# Patient Record
Sex: FEMALE | Race: WHITE | Employment: OTHER | URBAN - METROPOLITAN AREA
[De-identification: names, ages, dates, MRNs, and addresses within clinical notes are randomized per-mention and may not be internally consistent; named-entity substitution may affect disease eponyms.]

---

## 2017-02-07 ENCOUNTER — OFFICE VISIT (OUTPATIENT)
Dept: OBGYN CLINIC | Facility: CLINIC | Age: 27
End: 2017-02-07

## 2017-02-07 VITALS
SYSTOLIC BLOOD PRESSURE: 120 MMHG | HEIGHT: 67.75 IN | DIASTOLIC BLOOD PRESSURE: 84 MMHG | HEART RATE: 74 BPM | WEIGHT: 148 LBS | BODY MASS INDEX: 22.69 KG/M2

## 2017-02-07 DIAGNOSIS — Z11.3 SCREEN FOR STD (SEXUALLY TRANSMITTED DISEASE): ICD-10-CM

## 2017-02-07 DIAGNOSIS — R87.620 ASCUS WITH POSITIVE HIGH RISK HUMAN PAPILLOMAVIRUS OF VAGINA: ICD-10-CM

## 2017-02-07 DIAGNOSIS — Z01.419 WELL WOMAN EXAM WITH ROUTINE GYNECOLOGICAL EXAM: Primary | ICD-10-CM

## 2017-02-07 DIAGNOSIS — R87.811 ASCUS WITH POSITIVE HIGH RISK HUMAN PAPILLOMAVIRUS OF VAGINA: ICD-10-CM

## 2017-02-07 PROCEDURE — 99395 PREV VISIT EST AGE 18-39: CPT | Performed by: OBSTETRICS & GYNECOLOGY

## 2017-02-07 PROCEDURE — 88175 CYTOPATH C/V AUTO FLUID REDO: CPT | Performed by: OBSTETRICS & GYNECOLOGY

## 2017-02-07 PROCEDURE — 87591 N.GONORRHOEAE DNA AMP PROB: CPT | Performed by: OBSTETRICS & GYNECOLOGY

## 2017-02-07 PROCEDURE — 87624 HPV HI-RISK TYP POOLED RSLT: CPT | Performed by: OBSTETRICS & GYNECOLOGY

## 2017-02-07 PROCEDURE — 87491 CHLMYD TRACH DNA AMP PROBE: CPT | Performed by: OBSTETRICS & GYNECOLOGY

## 2017-02-07 NOTE — PROGRESS NOTES
Here for new gynecology visit. 27year old G 0 P 0. Patient's last menstrual period was 01/31/2017. Migel Vila Here for general gyn exam and follow up abnormal pap smear. Had initial pap smear one year ago and ASCUS with HR HPV. Never got Gardasil series.  No p with mild symmetrical eversion. Pap smear was done with HPV. Accepts STD screening. UTERUS:  Anteflexed and normal size. ADNEXA:  No mass or tenderness. RECTOVAGINAL:  Not indicated. EXTREMITIES:  Unremarkable.       IMP:  Normal gyn exam. Hx of ASCUS

## 2017-02-08 LAB
C TRACH DNA SPEC QL NAA+PROBE: NEGATIVE
N GONORRHOEA DNA SPEC QL NAA+PROBE: NEGATIVE

## 2017-02-09 ENCOUNTER — TELEPHONE (OUTPATIENT)
Dept: OBGYN CLINIC | Facility: CLINIC | Age: 27
End: 2017-02-09

## 2017-02-09 LAB — LAST PAP RESULT: NORMAL

## 2017-02-09 NOTE — TELEPHONE ENCOUNTER
Advised pt GC/Chlamydia negative; pap normal; HPV pending. Advised pt will call when results available. Patient verbalized understanding.

## 2017-02-09 NOTE — PROGRESS NOTES
Quick Note:    Left detailed message on cell voicemail. Pt has FYI. STI testing negative. Call if any questions or concerns.   ______

## 2017-02-10 LAB — HPV I/H RISK 1 DNA SPEC QL NAA+PROBE: POSITIVE

## 2017-02-10 NOTE — PROGRESS NOTES
Quick Note:    Repeat pap smear same - ASCUS with HPV. Based on age, low risk for significant cervical problem. Needs repeat pap smear in one year.  Gardasil series still option for pt.  ______

## 2017-02-14 NOTE — PROGRESS NOTES
Quick Note:    Patient notified of pap results. Advised patient we will repeat the pap in 1 year. Discussed Guardasil series, recommendations. Patient would like to do some research but will call if desires.   ______

## 2017-04-29 ENCOUNTER — HOSPITAL ENCOUNTER (OUTPATIENT)
Age: 27
Discharge: HOME | End: 2017-04-29
Attending: EMERGENCY MEDICINE
Payer: COMMERCIAL

## 2017-04-29 VITALS
WEIGHT: 150 LBS | OXYGEN SATURATION: 100 % | HEART RATE: 97 BPM | TEMPERATURE: 98 F | SYSTOLIC BLOOD PRESSURE: 134 MMHG | DIASTOLIC BLOOD PRESSURE: 75 MMHG | BODY MASS INDEX: 22.73 KG/M2 | RESPIRATION RATE: 18 BRPM | HEIGHT: 68 IN

## 2017-04-29 DIAGNOSIS — S39.012A LUMBAR STRAIN, INITIAL ENCOUNTER: Primary | ICD-10-CM

## 2017-04-29 PROCEDURE — 81002 URINALYSIS NONAUTO W/O SCOPE: CPT

## 2017-04-29 PROCEDURE — 99204 OFFICE O/P NEW MOD 45 MIN: CPT

## 2017-04-29 PROCEDURE — 99213 OFFICE O/P EST LOW 20 MIN: CPT

## 2017-04-29 RX ORDER — CYCLOBENZAPRINE HCL 10 MG
5 TABLET ORAL 3 TIMES DAILY PRN
Qty: 15 TABLET | Refills: 0 | Status: SHIPPED | OUTPATIENT
Start: 2017-04-29 | End: 2017-05-04

## 2017-04-29 RX ORDER — IBUPROFEN 600 MG/1
600 TABLET ORAL EVERY 8 HOURS PRN
Qty: 30 TABLET | Refills: 0 | Status: SHIPPED | OUTPATIENT
Start: 2017-04-29 | End: 2017-05-06

## 2017-04-29 RX ORDER — HYDROCODONE BITARTRATE AND ACETAMINOPHEN 5; 325 MG/1; MG/1
1 TABLET ORAL EVERY 8 HOURS PRN
Qty: 15 TABLET | Refills: 0 | Status: SHIPPED | OUTPATIENT
Start: 2017-04-29 | End: 2017-05-06

## 2017-04-29 NOTE — ED INITIAL ASSESSMENT (HPI)
Patient presents with back pain which started on Thursday, following working out at the gym. She has attempted working through the pain, but it has progressed to the point that she cannot walk,  sit or sleep without discomfort.

## 2017-04-29 NOTE — ED PROVIDER NOTES
Patient Seen in: [address]    History   Patient presents with:  Back Pain (musculoskeletal)    Stated Complaint: lower back pain    HPI    The patient is a 81-year-old female with no significant past medical history pre except as otherwise stated in HPI.     Physical Exam       ED Triage Vitals   BP 04/29/17 1001 134/75 mmHg   Pulse 04/29/17 1001 97   Resp 04/29/17 1001 18   Temp 04/29/17 1001 98.2 °F (36.8 °C)   Temp src 04/29/17 1001 Oral   SpO2 04/29/17 1001 100 %   O2 by mouth every 8 (eight) hours as needed for Pain or Fever. Qty: 30 tablet Refills: 0    HYDROcodone-acetaminophen 5-325 MG Oral Tab  Take 1 tablet by mouth every 8 (eight) hours as needed for Pain.   Qty: 15 tablet Refills: 0    Cyclobenzaprine HCl 10 MG

## 2018-01-31 ENCOUNTER — OFFICE VISIT (OUTPATIENT)
Dept: FAMILY MEDICINE CLINIC | Facility: CLINIC | Age: 28
End: 2018-01-31

## 2018-01-31 ENCOUNTER — LAB ENCOUNTER (OUTPATIENT)
Dept: LAB | Age: 28
End: 2018-01-31
Attending: FAMILY MEDICINE
Payer: COMMERCIAL

## 2018-01-31 VITALS
DIASTOLIC BLOOD PRESSURE: 80 MMHG | WEIGHT: 157 LBS | SYSTOLIC BLOOD PRESSURE: 120 MMHG | HEIGHT: 67.75 IN | RESPIRATION RATE: 18 BRPM | BODY MASS INDEX: 24.07 KG/M2 | HEART RATE: 70 BPM | TEMPERATURE: 98 F

## 2018-01-31 DIAGNOSIS — Z00.00 ROUTINE GENERAL MEDICAL EXAMINATION AT A HEALTH CARE FACILITY: ICD-10-CM

## 2018-01-31 DIAGNOSIS — Z00.00 ROUTINE GENERAL MEDICAL EXAMINATION AT A HEALTH CARE FACILITY: Primary | ICD-10-CM

## 2018-01-31 LAB
25-HYDROXYVITAMIN D (TOTAL): 30.1 NG/ML (ref 30–100)
ALBUMIN SERPL-MCNC: 4.7 G/DL (ref 3.5–4.8)
ALP LIVER SERPL-CCNC: 58 U/L (ref 37–98)
ALT SERPL-CCNC: 20 U/L (ref 14–54)
AST SERPL-CCNC: 17 U/L (ref 15–41)
BASOPHILS # BLD AUTO: 0.08 X10(3) UL (ref 0–0.1)
BASOPHILS NFR BLD AUTO: 1.3 %
BILIRUB SERPL-MCNC: 0.5 MG/DL (ref 0.1–2)
BILIRUB UR QL STRIP.AUTO: NEGATIVE
BUN BLD-MCNC: 6 MG/DL (ref 8–20)
CALCIUM BLD-MCNC: 8.8 MG/DL (ref 8.3–10.3)
CHLORIDE: 104 MMOL/L (ref 101–111)
CHOLEST SMN-MCNC: 158 MG/DL (ref ?–200)
CLARITY UR REFRACT.AUTO: CLEAR
CO2: 25 MMOL/L (ref 22–32)
COLOR UR AUTO: YELLOW
CREAT BLD-MCNC: 0.56 MG/DL (ref 0.55–1.02)
EOSINOPHIL # BLD AUTO: 0.09 X10(3) UL (ref 0–0.3)
EOSINOPHIL NFR BLD AUTO: 1.5 %
ERYTHROCYTE [DISTWIDTH] IN BLOOD BY AUTOMATED COUNT: 11.9 % (ref 11.5–16)
GLUCOSE BLD-MCNC: 79 MG/DL (ref 70–99)
GLUCOSE UR STRIP.AUTO-MCNC: NEGATIVE MG/DL
HCT VFR BLD AUTO: 41.5 % (ref 34–50)
HDLC SERPL-MCNC: 91 MG/DL (ref 45–?)
HDLC SERPL: 1.74 {RATIO} (ref ?–4.44)
HGB BLD-MCNC: 13.8 G/DL (ref 12–16)
IMMATURE GRANULOCYTE COUNT: 0.01 X10(3) UL (ref 0–1)
IMMATURE GRANULOCYTE RATIO %: 0.2 %
KETONES UR STRIP.AUTO-MCNC: NEGATIVE MG/DL
LDLC SERPL CALC-MCNC: 58 MG/DL (ref ?–130)
LEUKOCYTE ESTERASE UR QL STRIP.AUTO: NEGATIVE
LYMPHOCYTES # BLD AUTO: 2.16 X10(3) UL (ref 0.9–4)
LYMPHOCYTES NFR BLD AUTO: 36.4 %
M PROTEIN MFR SERPL ELPH: 8.4 G/DL (ref 6.1–8.3)
MCH RBC QN AUTO: 32.3 PG (ref 27–33.2)
MCHC RBC AUTO-ENTMCNC: 33.3 G/DL (ref 31–37)
MCV RBC AUTO: 97.2 FL (ref 81–100)
MONOCYTES # BLD AUTO: 0.56 X10(3) UL (ref 0.1–0.6)
MONOCYTES NFR BLD AUTO: 9.4 %
NEUTROPHIL ABS PRELIM: 3.04 X10 (3) UL (ref 1.3–6.7)
NEUTROPHILS # BLD AUTO: 3.04 X10(3) UL (ref 1.3–6.7)
NEUTROPHILS NFR BLD AUTO: 51.2 %
NITRITE UR QL STRIP.AUTO: NEGATIVE
NONHDLC SERPL-MCNC: 67 MG/DL (ref ?–130)
PH UR STRIP.AUTO: 8 [PH] (ref 4.5–8)
PLATELET # BLD AUTO: 212 10(3)UL (ref 150–450)
POTASSIUM SERPL-SCNC: 4.1 MMOL/L (ref 3.6–5.1)
PROT UR STRIP.AUTO-MCNC: NEGATIVE MG/DL
RBC # BLD AUTO: 4.27 X10(6)UL (ref 3.8–5.1)
RBC UR QL AUTO: NEGATIVE
RED CELL DISTRIBUTION WIDTH-SD: 42.5 FL (ref 35.1–46.3)
SODIUM SERPL-SCNC: 141 MMOL/L (ref 136–144)
SP GR UR STRIP.AUTO: 1.02 (ref 1–1.03)
TRIGL SERPL-MCNC: 46 MG/DL (ref ?–150)
TSI SER-ACNC: 3.3 MIU/ML (ref 0.35–5.5)
UROBILINOGEN UR STRIP.AUTO-MCNC: <2 MG/DL
VLDLC SERPL CALC-MCNC: 9 MG/DL (ref 5–40)
WBC # BLD AUTO: 5.9 X10(3) UL (ref 4–13)

## 2018-01-31 PROCEDURE — 85025 COMPLETE CBC W/AUTO DIFF WBC: CPT

## 2018-01-31 PROCEDURE — 81003 URINALYSIS AUTO W/O SCOPE: CPT

## 2018-01-31 PROCEDURE — 80061 LIPID PANEL: CPT

## 2018-01-31 PROCEDURE — 36415 COLL VENOUS BLD VENIPUNCTURE: CPT

## 2018-01-31 PROCEDURE — 80053 COMPREHEN METABOLIC PANEL: CPT

## 2018-01-31 PROCEDURE — 82306 VITAMIN D 25 HYDROXY: CPT

## 2018-01-31 PROCEDURE — 99395 PREV VISIT EST AGE 18-39: CPT | Performed by: FAMILY MEDICINE

## 2018-01-31 PROCEDURE — 84443 ASSAY THYROID STIM HORMONE: CPT

## 2018-01-31 NOTE — PATIENT INSTRUCTIONS
Prevention Guidelines, Women Ages 25 to 44  Screening tests and vaccines are an important part of managing your health. Health counseling is essential, too. Below are guidelines for these, for women ages 25 to 44.  Talk with your healthcare provider to ma Tuberculosis Women at increased risk for infection should talk with their healthcare provider Ask your healthcare provider   Vision All women in this age group At least 1 complete exam in your 25s, and 2 in your 35s   Vaccine2 Who needs it How often   Chic Tetanus/diphtheria/pertussis (Td/Tdap) booster All women in this age group Td every 10 years, or a one-time dose of Tdap instead of a Td booster after age 25, then Td every 10 years   Counseling Who needs it How often   BRCA gene mutation testing for breas

## 2018-01-31 NOTE — PROGRESS NOTES
HPI:   Evangelina Hanson is a 24year old female who presents for a complete physical exam. Symptoms: denies discharge, itching, burning or dysuria, periods are regular. Patient complains of nothing today.   Denies any recent illnesses or hospitalizations blurred vision or double vision  HEENT: denies nasal congestion, sinus pain or ST  LUNGS: denies shortness of breath with exertion, denies cough  CARDIOVASCULAR: denies chest pain on exertion or at rest, denies palpitations  GI: denies abdominal pain,denie PANEL    Discussed diet and exercise. Pt' s weight is Body mass index is 24.05 kg/m². , recommended low fat diet and aerobic exercise 30 minutes three times weekly. The patient indicates understanding of these issues and agrees to the plan.     Routine g

## 2018-02-22 ENCOUNTER — OFFICE VISIT (OUTPATIENT)
Dept: OBGYN CLINIC | Facility: CLINIC | Age: 28
End: 2018-02-22

## 2018-02-22 VITALS
DIASTOLIC BLOOD PRESSURE: 66 MMHG | BODY MASS INDEX: 23.49 KG/M2 | HEIGHT: 68 IN | WEIGHT: 155 LBS | SYSTOLIC BLOOD PRESSURE: 112 MMHG

## 2018-02-22 DIAGNOSIS — Z87.42 HX OF ABNORMAL CERVICAL PAP SMEAR: Primary | ICD-10-CM

## 2018-02-22 DIAGNOSIS — Z11.3 SCREENING EXAMINATION FOR STD (SEXUALLY TRANSMITTED DISEASE): ICD-10-CM

## 2018-02-22 DIAGNOSIS — Z01.419 WELL WOMAN EXAM WITH ROUTINE GYNECOLOGICAL EXAM: ICD-10-CM

## 2018-02-22 PROCEDURE — 88175 CYTOPATH C/V AUTO FLUID REDO: CPT | Performed by: OBSTETRICS & GYNECOLOGY

## 2018-02-22 PROCEDURE — 87491 CHLMYD TRACH DNA AMP PROBE: CPT | Performed by: OBSTETRICS & GYNECOLOGY

## 2018-02-22 PROCEDURE — 87624 HPV HI-RISK TYP POOLED RSLT: CPT | Performed by: OBSTETRICS & GYNECOLOGY

## 2018-02-22 PROCEDURE — 87591 N.GONORRHOEAE DNA AMP PROB: CPT | Performed by: OBSTETRICS & GYNECOLOGY

## 2018-02-22 PROCEDURE — 99395 PREV VISIT EST AGE 18-39: CPT | Performed by: OBSTETRICS & GYNECOLOGY

## 2018-02-22 NOTE — PROGRESS NOTES
[address] [facility]  Obstetrics and Gynecology  History & Physical    CC: Patient is here for annual well woman exam     Subjective:     HPI: Carlitos Mae is a 24year old [place] female here for annual well women exam.  Patient reports no compaint maintenance:  Mammogram (age 36 and q1-2yr): n/a  Colonoscopy (age 48 and q7yr): n/a    Review of Systems:  General:  denies fevers, chills, fatigue and malaise  Breast:  denies rashes, skin changes, pain, lumps or discharge   Respiratory:  denies SOB, dy understanding and agrees with the plan of care. All questions were answered to the best of my ability at this time.       RTC in 1 year for a well woman exam or sooner if needed     Chelita Hirsch MD   [facility] - OBGYN

## 2018-02-23 PROBLEM — Z01.419 WELL WOMAN EXAM WITH ROUTINE GYNECOLOGICAL EXAM: Status: ACTIVE | Noted: 2018-02-23

## 2018-02-23 PROBLEM — Z11.3 SCREENING EXAMINATION FOR STD (SEXUALLY TRANSMITTED DISEASE): Status: ACTIVE | Noted: 2018-02-23

## 2018-02-23 PROBLEM — Z87.42 HX OF ABNORMAL CERVICAL PAP SMEAR: Status: ACTIVE | Noted: 2018-02-23

## 2018-02-23 LAB
C TRACH DNA SPEC QL NAA+PROBE: NEGATIVE
N GONORRHOEA DNA SPEC QL NAA+PROBE: NEGATIVE

## 2018-02-27 LAB — HPV I/H RISK 1 DNA SPEC QL NAA+PROBE: POSITIVE

## 2018-02-28 NOTE — PROGRESS NOTES
Patient informed of results. Verbalized understanding. No further questions or concerns. Brochure sent to patient.

## 2018-02-28 NOTE — PROGRESS NOTES
Please inform the patient of abnormal pap smear findings including ASCUS with HPV positive. Recommend colposcopy. Please provide information and schedule for procedure.

## 2018-04-24 ENCOUNTER — OFFICE VISIT (OUTPATIENT)
Dept: OBGYN CLINIC | Facility: CLINIC | Age: 28
End: 2018-04-24

## 2018-04-24 VITALS
SYSTOLIC BLOOD PRESSURE: 112 MMHG | HEIGHT: 68 IN | DIASTOLIC BLOOD PRESSURE: 72 MMHG | WEIGHT: 157 LBS | BODY MASS INDEX: 23.79 KG/M2

## 2018-04-24 DIAGNOSIS — Z23 NEED FOR VACCINATION: ICD-10-CM

## 2018-04-24 DIAGNOSIS — R87.610 ASCUS WITH POSITIVE HIGH RISK HPV CERVICAL: ICD-10-CM

## 2018-04-24 DIAGNOSIS — Z23 NEED FOR HPV VACCINE: ICD-10-CM

## 2018-04-24 DIAGNOSIS — Z32.02 PREGNANCY EXAMINATION OR TEST, NEGATIVE RESULT: Primary | ICD-10-CM

## 2018-04-24 DIAGNOSIS — R87.810 ASCUS WITH POSITIVE HIGH RISK HPV CERVICAL: ICD-10-CM

## 2018-04-24 PROCEDURE — 57454 BX/CURETT OF CERVIX W/SCOPE: CPT | Performed by: OBSTETRICS & GYNECOLOGY

## 2018-04-24 PROCEDURE — 90471 IMMUNIZATION ADMIN: CPT | Performed by: OBSTETRICS & GYNECOLOGY

## 2018-04-24 PROCEDURE — 81025 URINE PREGNANCY TEST: CPT | Performed by: OBSTETRICS & GYNECOLOGY

## 2018-04-24 PROCEDURE — 88342 IMHCHEM/IMCYTCHM 1ST ANTB: CPT | Performed by: OBSTETRICS & GYNECOLOGY

## 2018-04-24 PROCEDURE — 90651 9VHPV VACCINE 2/3 DOSE IM: CPT | Performed by: OBSTETRICS & GYNECOLOGY

## 2018-04-24 PROCEDURE — 88305 TISSUE EXAM BY PATHOLOGIST: CPT | Performed by: OBSTETRICS & GYNECOLOGY

## 2018-04-24 NOTE — PROCEDURES
Colposcopy Procedure Note    Date of Procedure: 04/24/18    Indications: 24year old y/o female with ASCUS with HPV positive     Pre-procedure diagnosis:   ASCUS with HPV positive     Post-procedure diagnosis:  ASCUS with HPV positive     Procedure:  Colpo

## 2018-04-24 NOTE — PATIENT INSTRUCTIONS
[facility] Department of OB/GYN  After Care Instructions for Colposcopy/Biopsy      Biopsy Results   You will receive a phone call with your biopsy results in 7 business days. If you have not received your biopsy results in 7 days, please contact our office.   Tai Espinoza

## 2018-04-30 PROBLEM — Z11.3 SCREENING EXAMINATION FOR STD (SEXUALLY TRANSMITTED DISEASE): Status: RESOLVED | Noted: 2018-02-23 | Resolved: 2018-04-30

## 2018-04-30 PROBLEM — Z01.419 WELL WOMAN EXAM WITH ROUTINE GYNECOLOGICAL EXAM: Status: RESOLVED | Noted: 2018-02-23 | Resolved: 2018-04-30

## 2018-04-30 NOTE — PROGRESS NOTES
[facility]  Obstetrics and Gynecology  Follow Up Progress Note    Subjective:     Emi Thompson is a 24year old [place] female who is here for CM II follow up and discussion.  She is s/p colposcopy with ECC and cervical biopsy 2/2 ASCUS wi understanding and agrees with the plan of care. All questions were answered to the best of my ability at this time.     RTC in 6 months for colposcopy or sooner if needed     Will Morrow MD   [facility] - OBGYN

## 2018-05-01 ENCOUNTER — OFFICE VISIT (OUTPATIENT)
Dept: OBGYN CLINIC | Facility: CLINIC | Age: 28
End: 2018-05-01

## 2018-05-01 VITALS
DIASTOLIC BLOOD PRESSURE: 80 MMHG | HEIGHT: 68 IN | BODY MASS INDEX: 24.1 KG/M2 | SYSTOLIC BLOOD PRESSURE: 116 MMHG | WEIGHT: 159 LBS

## 2018-05-01 DIAGNOSIS — N87.1 CIN II (CERVICAL INTRAEPITHELIAL NEOPLASIA II): ICD-10-CM

## 2018-05-01 DIAGNOSIS — Z87.42 HX OF ABNORMAL CERVICAL PAP SMEAR: Primary | ICD-10-CM

## 2018-05-01 PROCEDURE — 99212 OFFICE O/P EST SF 10 MIN: CPT | Performed by: OBSTETRICS & GYNECOLOGY

## 2018-05-01 NOTE — PATIENT INSTRUCTIONS
Please return in 6 months for repeat colposcopy or sooner if having abnormal vaginal bleeding or severe pelvic pain

## 2018-07-02 ENCOUNTER — OFFICE VISIT (OUTPATIENT)
Dept: OBGYN CLINIC | Facility: CLINIC | Age: 28
End: 2018-07-02

## 2018-07-02 DIAGNOSIS — Z23 NEED FOR VACCINATION: Primary | ICD-10-CM

## 2018-07-02 PROCEDURE — 90651 9VHPV VACCINE 2/3 DOSE IM: CPT | Performed by: NURSE PRACTITIONER

## 2018-07-02 PROCEDURE — 90471 IMMUNIZATION ADMIN: CPT | Performed by: NURSE PRACTITIONER

## 2018-11-01 ENCOUNTER — OFFICE VISIT (OUTPATIENT)
Dept: OBGYN CLINIC | Facility: CLINIC | Age: 28
End: 2018-11-01
Payer: COMMERCIAL

## 2018-11-01 VITALS
WEIGHT: 160.63 LBS | DIASTOLIC BLOOD PRESSURE: 72 MMHG | SYSTOLIC BLOOD PRESSURE: 120 MMHG | HEART RATE: 78 BPM | BODY MASS INDEX: 24.34 KG/M2 | HEIGHT: 68 IN

## 2018-11-01 DIAGNOSIS — N87.1 MODERATE DYSPLASIA OF CERVIX: Primary | ICD-10-CM

## 2018-11-01 PROCEDURE — 57456 ENDOCERV CURETTAGE W/SCOPE: CPT | Performed by: OBSTETRICS & GYNECOLOGY

## 2018-11-01 PROCEDURE — 88305 TISSUE EXAM BY PATHOLOGIST: CPT | Performed by: OBSTETRICS & GYNECOLOGY

## 2018-11-01 NOTE — PROGRESS NOTES
COLPOSCOPY:   /72   Pulse 78   Ht 68\"   Wt 160 lb 9.6 oz   LMP 10/21/2018 (Exact Date)   BMI 24.42 kg/m²   Age: 34year old  Patient's last menstrual period was 10/21/2018 (exact date).   Pregnant: No  History of STDs: HPV  Smoker: No    Last Pap sme

## 2018-11-01 NOTE — PATIENT INSTRUCTIONS
[facility] [place] of [place]N  After Care Instructions for Colposcopy/Biopsy      Biopsy Results   You will receive a phone call with your biopsy results in 7 business days. If you have not received your biopsy results in 7 days, please contact our office.   Trudy Monteiro

## 2018-11-05 ENCOUNTER — OFFICE VISIT (OUTPATIENT)
Dept: OBGYN CLINIC | Facility: CLINIC | Age: 28
End: 2018-11-05
Payer: COMMERCIAL

## 2018-11-05 DIAGNOSIS — Z23 NEED FOR VACCINATION: Primary | ICD-10-CM

## 2018-11-05 PROCEDURE — 90471 IMMUNIZATION ADMIN: CPT | Performed by: NURSE PRACTITIONER

## 2018-11-05 PROCEDURE — 90651 9VHPV VACCINE 2/3 DOSE IM: CPT | Performed by: NURSE PRACTITIONER

## 2018-12-10 ENCOUNTER — TELEPHONE (OUTPATIENT)
Dept: OBGYN CLINIC | Facility: CLINIC | Age: 28
End: 2018-12-10

## 2019-03-19 ENCOUNTER — TELEPHONE (OUTPATIENT)
Dept: OBGYN CLINIC | Facility: CLINIC | Age: 29
End: 2019-03-19

## 2019-03-19 NOTE — TELEPHONE ENCOUNTER
Pt states that LMP was 10-. Informed pt that we still need office notes, ultrasound report and fts if one was done. Pt states that she did have fts. Pt states that she will have office notes, u/s report and fts sent to us by fax.   Pt states that

## 2019-03-19 NOTE — TELEPHONE ENCOUNTER
LMTCB.  WE STILL NEED LMP, OFFICE NOTES, ULTRASOUND REPORT IF ONE WAS DONE, FTS?  (RECORDS IN FAX BIN IN FRONT OFFICE)

## 2019-03-21 NOTE — TELEPHONE ENCOUNTER
Pt notified additional records have not been received. Pt states she will call [employer] today. Confirmed that pt has the correct clinic fax #.

## 2019-03-21 NOTE — TELEPHONE ENCOUNTER
Pt. wants to know if records from [facility] have been received by fax to our office this past Tues. ?

## 2019-03-25 NOTE — TELEPHONE ENCOUNTER
Per pt she had pap and colpo done through a dr in [facility] and was wondering if they have access to see it or if the Dr's can request it.  Please advise

## 2019-03-25 NOTE — TELEPHONE ENCOUNTER
Informed pt that her colpos were printed for the MD to review and put in the blue folder for the MDs to review.

## 2019-03-25 NOTE — TELEPHONE ENCOUNTER
Informed pt that [facility]. Pt states that she had ob ultrasounds done, but did not have FTS done. Placed folder on JLK's desk to start to have Mds review if pt is accepted or denied.   Informed pt to continue care

## 2019-03-26 NOTE — TELEPHONE ENCOUNTER
Pt informed that MDs have reviewed records and pts transfer has been accepted. Pt informed we have male and female MDs and she will see all of them. Pt informed that her first appt is with the RN and pt accepted appt on 4/3.  Pt provided with [facility] address an

## 2019-04-03 ENCOUNTER — NURSE ONLY (OUTPATIENT)
Dept: OBGYN CLINIC | Facility: CLINIC | Age: 29
End: 2019-04-03
Payer: COMMERCIAL

## 2019-04-03 ENCOUNTER — APPOINTMENT (OUTPATIENT)
Dept: LAB | Facility: HOSPITAL | Age: 29
End: 2019-04-03
Attending: OBSTETRICS & GYNECOLOGY
Payer: COMMERCIAL

## 2019-04-03 VITALS — BODY MASS INDEX: 26.52 KG/M2 | HEIGHT: 68 IN | WEIGHT: 175 LBS

## 2019-04-03 DIAGNOSIS — Z34.93 ENCOUNTER FOR SUPERVISION OF NORMAL PREGNANCY IN THIRD TRIMESTER, UNSPECIFIED GRAVIDITY: ICD-10-CM

## 2019-04-03 DIAGNOSIS — Z34.93 ENCOUNTER FOR SUPERVISION OF NORMAL PREGNANCY IN THIRD TRIMESTER, UNSPECIFIED GRAVIDITY: Primary | ICD-10-CM

## 2019-04-03 PROCEDURE — 36415 COLL VENOUS BLD VENIPUNCTURE: CPT

## 2019-04-03 PROCEDURE — 86803 HEPATITIS C AB TEST: CPT

## 2019-04-03 RX ORDER — GLYCERIN/MINERAL OIL
LOTION (ML) TOPICAL
COMMUNITY
End: 2020-12-01 | Stop reason: ALTCHOICE

## 2019-04-03 NOTE — PROGRESS NOTES
Pt seen for OBN appt today with no complaints. Normal PN labs were done with previous ob. Hep C  ordered. Pt advised all labs must be completed and resulted prior to MD appt.   Pt walked to  to schedule NPN appt with MD.    Transfer of Care fro [facility], [address]. Pets No        GENETICS SCREENING   is adopted, but pt stated she could answer for the genetics for the diseases below for her .     Patient greater than 35 No    Canavan Disease  No    Cystic Fibrosi

## 2019-04-08 ENCOUNTER — INITIAL PRENATAL (OUTPATIENT)
Dept: OBGYN CLINIC | Facility: CLINIC | Age: 29
End: 2019-04-08

## 2019-04-08 VITALS
HEART RATE: 67 BPM | DIASTOLIC BLOOD PRESSURE: 74 MMHG | BODY MASS INDEX: 27 KG/M2 | SYSTOLIC BLOOD PRESSURE: 115 MMHG | WEIGHT: 177 LBS

## 2019-04-08 DIAGNOSIS — Z34.92 ENCOUNTER FOR SUPERVISION OF NORMAL PREGNANCY IN SECOND TRIMESTER, UNSPECIFIED GRAVIDITY: Primary | ICD-10-CM

## 2019-04-08 PROCEDURE — 81002 URINALYSIS NONAUTO W/O SCOPE: CPT | Performed by: OBSTETRICS & GYNECOLOGY

## 2019-04-18 PROBLEM — Z83.49 FAMILY HISTORY OF PORPHYRIA: Status: ACTIVE | Noted: 2019-04-18

## 2019-04-18 PROBLEM — Z34.90 PREGNANCY: Status: ACTIVE | Noted: 2019-04-18

## 2019-04-18 NOTE — PROGRESS NOTES
33 yo  @ 23w6d by LMP c/w 1TUS who presents as MANJINDER. Previously receiving care at [facility]. Normal Level 1 US. History of abnormal pap. In 2018 had ASCUS/HPV+. 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 Elected for suirveillance and repeat colpo 6 mo later neg.   P

## 2019-04-22 ENCOUNTER — APPOINTMENT (OUTPATIENT)
Dept: LAB | Age: 29
End: 2019-04-22
Attending: FAMILY MEDICINE
Payer: COMMERCIAL

## 2019-04-22 DIAGNOSIS — Z34.92 ENCOUNTER FOR SUPERVISION OF NORMAL PREGNANCY IN SECOND TRIMESTER, UNSPECIFIED GRAVIDITY: ICD-10-CM

## 2019-04-22 PROCEDURE — 82950 GLUCOSE TEST: CPT

## 2019-04-22 PROCEDURE — 85027 COMPLETE CBC AUTOMATED: CPT

## 2019-04-22 PROCEDURE — 36415 COLL VENOUS BLD VENIPUNCTURE: CPT

## 2019-05-06 ENCOUNTER — ROUTINE PRENATAL (OUTPATIENT)
Dept: OBGYN CLINIC | Facility: CLINIC | Age: 29
End: 2019-05-06
Payer: COMMERCIAL

## 2019-05-06 VITALS
WEIGHT: 184.38 LBS | SYSTOLIC BLOOD PRESSURE: 119 MMHG | HEART RATE: 80 BPM | DIASTOLIC BLOOD PRESSURE: 76 MMHG | BODY MASS INDEX: 28 KG/M2

## 2019-05-06 DIAGNOSIS — Z34.92 ENCOUNTER FOR SUPERVISION OF NORMAL PREGNANCY IN SECOND TRIMESTER, UNSPECIFIED GRAVIDITY: Primary | ICD-10-CM

## 2019-05-06 PROCEDURE — 90471 IMMUNIZATION ADMIN: CPT | Performed by: OBSTETRICS & GYNECOLOGY

## 2019-05-06 PROCEDURE — 81002 URINALYSIS NONAUTO W/O SCOPE: CPT | Performed by: OBSTETRICS & GYNECOLOGY

## 2019-05-06 PROCEDURE — 90715 TDAP VACCINE 7 YRS/> IM: CPT | Performed by: OBSTETRICS & GYNECOLOGY

## 2019-05-06 NOTE — PROGRESS NOTES
Tdap given today per JLK on L. Deltoid. Pt tolerated well. VIS given. Encouraged to call with questions or concerns. Pt. Verbalized understanding.

## 2019-05-20 ENCOUNTER — ROUTINE PRENATAL (OUTPATIENT)
Dept: OBGYN CLINIC | Facility: CLINIC | Age: 29
End: 2019-05-20
Payer: COMMERCIAL

## 2019-05-20 VITALS
HEART RATE: 78 BPM | DIASTOLIC BLOOD PRESSURE: 75 MMHG | WEIGHT: 190 LBS | BODY MASS INDEX: 29 KG/M2 | SYSTOLIC BLOOD PRESSURE: 116 MMHG

## 2019-05-20 DIAGNOSIS — Z34.93 ENCOUNTER FOR SUPERVISION OF NORMAL PREGNANCY IN THIRD TRIMESTER, UNSPECIFIED GRAVIDITY: Primary | ICD-10-CM

## 2019-05-20 PROCEDURE — 81002 URINALYSIS NONAUTO W/O SCOPE: CPT | Performed by: OBSTETRICS & GYNECOLOGY

## 2019-06-03 ENCOUNTER — ROUTINE PRENATAL (OUTPATIENT)
Dept: OBGYN CLINIC | Facility: CLINIC | Age: 29
End: 2019-06-03
Payer: COMMERCIAL

## 2019-06-03 VITALS
SYSTOLIC BLOOD PRESSURE: 120 MMHG | DIASTOLIC BLOOD PRESSURE: 74 MMHG | BODY MASS INDEX: 29 KG/M2 | WEIGHT: 190 LBS | HEART RATE: 93 BPM

## 2019-06-03 DIAGNOSIS — Z34.93 ENCOUNTER FOR SUPERVISION OF NORMAL PREGNANCY IN THIRD TRIMESTER, UNSPECIFIED GRAVIDITY: Primary | ICD-10-CM

## 2019-06-03 PROCEDURE — 81002 URINALYSIS NONAUTO W/O SCOPE: CPT | Performed by: OBSTETRICS & GYNECOLOGY

## 2019-06-17 ENCOUNTER — ROUTINE PRENATAL (OUTPATIENT)
Dept: OBGYN CLINIC | Facility: CLINIC | Age: 29
End: 2019-06-17
Payer: COMMERCIAL

## 2019-06-17 VITALS
DIASTOLIC BLOOD PRESSURE: 73 MMHG | WEIGHT: 194.81 LBS | HEART RATE: 85 BPM | BODY MASS INDEX: 30 KG/M2 | SYSTOLIC BLOOD PRESSURE: 110 MMHG

## 2019-06-17 DIAGNOSIS — Z34.93 ENCOUNTER FOR SUPERVISION OF NORMAL PREGNANCY IN THIRD TRIMESTER, UNSPECIFIED GRAVIDITY: Primary | ICD-10-CM

## 2019-06-17 PROCEDURE — 81002 URINALYSIS NONAUTO W/O SCOPE: CPT | Performed by: OBSTETRICS & GYNECOLOGY

## 2019-07-01 ENCOUNTER — LAB ENCOUNTER (OUTPATIENT)
Dept: LAB | Age: 29
End: 2019-07-01
Attending: OBSTETRICS & GYNECOLOGY
Payer: COMMERCIAL

## 2019-07-01 DIAGNOSIS — Z34.93 ENCOUNTER FOR SUPERVISION OF NORMAL PREGNANCY IN THIRD TRIMESTER, UNSPECIFIED GRAVIDITY: ICD-10-CM

## 2019-07-01 LAB
DEPRECATED RDW RBC AUTO: 44.5 FL (ref 35.1–46.3)
ERYTHROCYTE [DISTWIDTH] IN BLOOD BY AUTOMATED COUNT: 12.5 % (ref 11–15)
HCT VFR BLD AUTO: 37.2 % (ref 35–48)
HGB BLD-MCNC: 12.2 G/DL (ref 12–16)
MCH RBC QN AUTO: 31.9 PG (ref 26–34)
MCHC RBC AUTO-ENTMCNC: 32.8 G/DL (ref 31–37)
MCV RBC AUTO: 97.4 FL (ref 80–100)
PLATELET # BLD AUTO: 163 10(3)UL (ref 150–450)
RBC # BLD AUTO: 3.82 X10(6)UL (ref 3.8–5.3)
WBC # BLD AUTO: 11.9 X10(3) UL (ref 4–11)

## 2019-07-01 PROCEDURE — 36415 COLL VENOUS BLD VENIPUNCTURE: CPT

## 2019-07-01 PROCEDURE — 86780 TREPONEMA PALLIDUM: CPT

## 2019-07-01 PROCEDURE — 85027 COMPLETE CBC AUTOMATED: CPT

## 2019-07-01 PROCEDURE — 87389 HIV-1 AG W/HIV-1&-2 AB AG IA: CPT

## 2019-07-02 ENCOUNTER — ROUTINE PRENATAL (OUTPATIENT)
Dept: OBGYN CLINIC | Facility: CLINIC | Age: 29
End: 2019-07-02
Payer: COMMERCIAL

## 2019-07-02 VITALS
DIASTOLIC BLOOD PRESSURE: 73 MMHG | SYSTOLIC BLOOD PRESSURE: 111 MMHG | BODY MASS INDEX: 30 KG/M2 | WEIGHT: 198 LBS | HEART RATE: 88 BPM

## 2019-07-02 DIAGNOSIS — Z34.93 ENCOUNTER FOR SUPERVISION OF NORMAL PREGNANCY IN THIRD TRIMESTER, UNSPECIFIED GRAVIDITY: Primary | ICD-10-CM

## 2019-07-02 LAB
MULTISTIX LOT#: NORMAL NUMERIC
PH, URINE: 7.5 (ref 4.5–8)
SPECIFIC GRAVITY: 1 (ref 1–1.03)
UROBILINOGEN,SEMI-QN: 0 MG/DL (ref 0–1.9)

## 2019-07-02 PROCEDURE — 81002 URINALYSIS NONAUTO W/O SCOPE: CPT | Performed by: OBSTETRICS & GYNECOLOGY

## 2019-07-03 LAB — T PALLIDUM AB SER QL: NEGATIVE

## 2019-07-08 ENCOUNTER — ROUTINE PRENATAL (OUTPATIENT)
Dept: OBGYN CLINIC | Facility: CLINIC | Age: 29
End: 2019-07-08
Payer: COMMERCIAL

## 2019-07-08 VITALS
BODY MASS INDEX: 30 KG/M2 | HEART RATE: 82 BPM | DIASTOLIC BLOOD PRESSURE: 71 MMHG | SYSTOLIC BLOOD PRESSURE: 107 MMHG | WEIGHT: 199 LBS

## 2019-07-08 DIAGNOSIS — Z34.93 ENCOUNTER FOR SUPERVISION OF NORMAL PREGNANCY IN THIRD TRIMESTER, UNSPECIFIED GRAVIDITY: Primary | ICD-10-CM

## 2019-07-08 LAB
APPEARANCE: CLEAR
MULTISTIX LOT#: ABNORMAL NUMERIC
PH, URINE: 8.5 (ref 4.5–8)
SPECIFIC GRAVITY: 1 (ref 1–1.03)
URINE-COLOR: YELLOW

## 2019-07-08 PROCEDURE — 81002 URINALYSIS NONAUTO W/O SCOPE: CPT | Performed by: OBSTETRICS & GYNECOLOGY

## 2019-07-15 ENCOUNTER — ROUTINE PRENATAL (OUTPATIENT)
Dept: OBGYN CLINIC | Facility: CLINIC | Age: 29
End: 2019-07-15
Payer: COMMERCIAL

## 2019-07-15 VITALS
WEIGHT: 200.38 LBS | HEART RATE: 84 BPM | BODY MASS INDEX: 30 KG/M2 | SYSTOLIC BLOOD PRESSURE: 110 MMHG | DIASTOLIC BLOOD PRESSURE: 74 MMHG

## 2019-07-15 DIAGNOSIS — Z34.93 ENCOUNTER FOR SUPERVISION OF NORMAL PREGNANCY IN THIRD TRIMESTER, UNSPECIFIED GRAVIDITY: Primary | ICD-10-CM

## 2019-07-15 LAB
APPEARANCE: CLEAR
MULTISTIX LOT#: ABNORMAL NUMERIC
PH, URINE: 6.5 (ref 4.5–8)
SPECIFIC GRAVITY: 1 (ref 1–1.03)
URINE-COLOR: YELLOW
UROBILINOGEN,SEMI-QN: 0.2 MG/DL (ref 0–1.9)

## 2019-07-15 PROCEDURE — 81002 URINALYSIS NONAUTO W/O SCOPE: CPT | Performed by: OBSTETRICS & GYNECOLOGY

## 2019-07-22 ENCOUNTER — ROUTINE PRENATAL (OUTPATIENT)
Dept: OBGYN CLINIC | Facility: CLINIC | Age: 29
End: 2019-07-22
Payer: COMMERCIAL

## 2019-07-22 VITALS
BODY MASS INDEX: 31 KG/M2 | SYSTOLIC BLOOD PRESSURE: 112 MMHG | HEART RATE: 90 BPM | WEIGHT: 204 LBS | DIASTOLIC BLOOD PRESSURE: 73 MMHG

## 2019-07-22 DIAGNOSIS — Z34.93 ENCOUNTER FOR SUPERVISION OF NORMAL PREGNANCY IN THIRD TRIMESTER, UNSPECIFIED GRAVIDITY: Primary | ICD-10-CM

## 2019-07-22 LAB
MULTISTIX LOT#: NORMAL NUMERIC
PH, URINE: 7 (ref 4.5–8)
SPECIFIC GRAVITY: 1.01 (ref 1–1.03)

## 2019-07-22 PROCEDURE — 81002 URINALYSIS NONAUTO W/O SCOPE: CPT | Performed by: OBSTETRICS & GYNECOLOGY

## 2019-07-29 ENCOUNTER — ROUTINE PRENATAL (OUTPATIENT)
Dept: OBGYN CLINIC | Facility: CLINIC | Age: 29
End: 2019-07-29
Payer: COMMERCIAL

## 2019-07-29 ENCOUNTER — TELEPHONE (OUTPATIENT)
Dept: OBGYN CLINIC | Facility: CLINIC | Age: 29
End: 2019-07-29

## 2019-07-29 VITALS
DIASTOLIC BLOOD PRESSURE: 72 MMHG | HEART RATE: 106 BPM | BODY MASS INDEX: 31 KG/M2 | SYSTOLIC BLOOD PRESSURE: 110 MMHG | WEIGHT: 204.38 LBS

## 2019-07-29 DIAGNOSIS — Z34.93 ENCOUNTER FOR SUPERVISION OF NORMAL PREGNANCY IN THIRD TRIMESTER, UNSPECIFIED GRAVIDITY: Primary | ICD-10-CM

## 2019-07-29 LAB
APPEARANCE: CLEAR
MULTISTIX LOT#: NORMAL NUMERIC
PH, URINE: 6 (ref 4.5–8)
SPECIFIC GRAVITY: 1.01 (ref 1–1.03)
URINE-COLOR: YELLOW
UROBILINOGEN,SEMI-QN: 0.2 MG/DL (ref 0–1.9)

## 2019-07-29 PROCEDURE — 81002 URINALYSIS NONAUTO W/O SCOPE: CPT | Performed by: OBSTETRICS & GYNECOLOGY

## 2019-07-29 NOTE — TELEPHONE ENCOUNTER
Assisted pt with scheduling apt for 8/5/19 at 10am with SHELLY at [facility]. Pt verbalized understanding.

## 2019-07-29 NOTE — TELEPHONE ENCOUNTER
40 wks Pn, needs to be seen 8-5  Pt has adriano't at [facility] 8-5-19 at 8:30, will need before 8:30 or after    Please advise

## 2019-08-05 ENCOUNTER — ROUTINE PRENATAL (OUTPATIENT)
Dept: OBGYN CLINIC | Facility: CLINIC | Age: 29
End: 2019-08-05
Payer: COMMERCIAL

## 2019-08-05 ENCOUNTER — HOSPITAL ENCOUNTER (OUTPATIENT)
Dept: PERINATAL CARE | Facility: HOSPITAL | Age: 29
Discharge: HOME | End: 2019-08-05
Attending: OBSTETRICS & GYNECOLOGY
Payer: COMMERCIAL

## 2019-08-05 VITALS — SYSTOLIC BLOOD PRESSURE: 127 MMHG | DIASTOLIC BLOOD PRESSURE: 82 MMHG | HEART RATE: 83 BPM

## 2019-08-05 VITALS
WEIGHT: 205.81 LBS | SYSTOLIC BLOOD PRESSURE: 126 MMHG | BODY MASS INDEX: 31 KG/M2 | DIASTOLIC BLOOD PRESSURE: 81 MMHG | HEART RATE: 88 BPM

## 2019-08-05 DIAGNOSIS — Z87.59 PERSONAL HISTORY OF PREVIOUS POSTDATES PREGNANCY: Primary | ICD-10-CM

## 2019-08-05 DIAGNOSIS — O48.0 POST-TERM PREGNANCY, 40-42 WEEKS OF GESTATION: ICD-10-CM

## 2019-08-05 DIAGNOSIS — Z34.93 ENCOUNTER FOR SUPERVISION OF NORMAL PREGNANCY IN THIRD TRIMESTER, UNSPECIFIED GRAVIDITY: Primary | ICD-10-CM

## 2019-08-05 LAB
MULTISTIX LOT#: NORMAL NUMERIC
PH, URINE: 6 (ref 4.5–8)
SPECIFIC GRAVITY: 1 (ref 1–1.03)
UROBILINOGEN,SEMI-QN: 0 MG/DL (ref 0–1.9)

## 2019-08-05 PROCEDURE — 76815 OB US LIMITED FETUS(S): CPT | Performed by: OBSTETRICS & GYNECOLOGY

## 2019-08-05 PROCEDURE — 81002 URINALYSIS NONAUTO W/O SCOPE: CPT | Performed by: OBSTETRICS & GYNECOLOGY

## 2019-08-05 PROCEDURE — 59025 FETAL NON-STRESS TEST: CPT | Performed by: OBSTETRICS & GYNECOLOGY

## 2019-08-05 NOTE — ADDENDUM NOTE
Encounter addended by: Angel Ireland RN on: 8/5/2019 9:42 AM   Actions taken: Visit Navigator Flowsheet section accepted

## 2019-08-05 NOTE — NST
Nonstress Test   Patient: Hamlet Serrano    Gestation: 40w6d    NST:       Variability: Moderate           Accelerations: Yes           Decelerations: None            Baseline: 140 BPM           Uterine Irritability: No           Contractions: Not pre

## 2019-08-06 ENCOUNTER — TELEPHONE (OUTPATIENT)
Dept: OBGYN CLINIC | Facility: CLINIC | Age: 29
End: 2019-08-06

## 2019-08-06 NOTE — TELEPHONE ENCOUNTER
I called [facility] and no room for Cervidil on evening of 08-08. Therefore we are scheduled at 1800 tomorrow for Cervidil. Orders done.

## 2019-08-06 NOTE — PROGRESS NOTES
Saqib at visit. NST done prior to visit. ALEX in office 4.1,3.1,3.2,0.0 = 10.4 cm. Plan Cervidil on Wed or Thursday depending on [facility] schedule.

## 2019-08-06 NOTE — TELEPHONE ENCOUNTER
See other 8/6/19 comm. SHELLY put note in pts chart stating that pt is scheduled for cervidil induction tomorrow at 1800. Spoke with SHELLY who stated that pt is aware of induction date and time.

## 2019-08-07 ENCOUNTER — HOSPITAL ENCOUNTER (INPATIENT)
Facility: HOSPITAL | Age: 29
LOS: 4 days | Discharge: HOME | End: 2019-08-11
Attending: OBSTETRICS & GYNECOLOGY | Admitting: OBSTETRICS & GYNECOLOGY
Payer: COMMERCIAL

## 2019-08-07 ENCOUNTER — APPOINTMENT (OUTPATIENT)
Dept: OBGYN CLINIC | Facility: HOSPITAL | Age: 29
End: 2019-08-07
Attending: OBSTETRICS & GYNECOLOGY
Payer: COMMERCIAL

## 2019-08-07 ENCOUNTER — TELEPHONE (OUTPATIENT)
Dept: OBGYN UNIT | Facility: HOSPITAL | Age: 29
End: 2019-08-07

## 2019-08-07 PROBLEM — O48.0 POST-DATES PREGNANCY: Status: ACTIVE | Noted: 2019-08-07

## 2019-08-07 LAB
ANTIBODY SCREEN: NEGATIVE
DEPRECATED RDW RBC AUTO: 44.9 FL (ref 35.1–46.3)
ERYTHROCYTE [DISTWIDTH] IN BLOOD BY AUTOMATED COUNT: 13 % (ref 11–15)
HCT VFR BLD AUTO: 36.2 % (ref 35–48)
HGB BLD-MCNC: 12.2 G/DL (ref 12–16)
MCH RBC QN AUTO: 32.1 PG (ref 26–34)
MCHC RBC AUTO-ENTMCNC: 33.7 G/DL (ref 31–37)
MCV RBC AUTO: 95.3 FL (ref 80–100)
PLATELET # BLD AUTO: 178 10(3)UL (ref 150–450)
RBC # BLD AUTO: 3.8 X10(6)UL (ref 3.8–5.3)
RH BLOOD TYPE: POSITIVE
WBC # BLD AUTO: 11.5 X10(3) UL (ref 4–11)

## 2019-08-07 PROCEDURE — 3E0P7VZ INTRODUCTION OF HORMONE INTO FEMALE REPRODUCTIVE, VIA NATURAL OR ARTIFICIAL OPENING: ICD-10-PCS | Performed by: OBSTETRICS & GYNECOLOGY

## 2019-08-07 RX ORDER — SODIUM CHLORIDE 0.9 % (FLUSH) 0.9 %
10 SYRINGE (ML) INJECTION AS NEEDED
Status: DISCONTINUED | OUTPATIENT
Start: 2019-08-07 | End: 2019-08-09 | Stop reason: HOSPADM

## 2019-08-07 RX ORDER — AMMONIA INHALANTS 0.04 G/.3ML
0.3 INHALANT RESPIRATORY (INHALATION) AS NEEDED
Status: DISCONTINUED | OUTPATIENT
Start: 2019-08-07 | End: 2019-08-09 | Stop reason: HOSPADM

## 2019-08-07 RX ORDER — ONDANSETRON 2 MG/ML
4 INJECTION INTRAMUSCULAR; INTRAVENOUS EVERY 6 HOURS PRN
Status: DISCONTINUED | OUTPATIENT
Start: 2019-08-07 | End: 2019-08-09

## 2019-08-07 RX ORDER — NALBUPHINE HCL 10 MG/ML
10 AMPUL (ML) INJECTION ONCE
Status: DISCONTINUED | OUTPATIENT
Start: 2019-08-07 | End: 2019-08-09

## 2019-08-07 RX ORDER — IBUPROFEN 600 MG/1
600 TABLET ORAL ONCE AS NEEDED
Status: DISCONTINUED | OUTPATIENT
Start: 2019-08-07 | End: 2019-08-09 | Stop reason: HOSPADM

## 2019-08-07 RX ORDER — TERBUTALINE SULFATE 1 MG/ML
0.25 INJECTION, SOLUTION SUBCUTANEOUS AS NEEDED
Status: DISCONTINUED | OUTPATIENT
Start: 2019-08-07 | End: 2019-08-09

## 2019-08-07 RX ORDER — LIDOCAINE HYDROCHLORIDE 10 MG/ML
30 INJECTION, SOLUTION EPIDURAL; INFILTRATION; INTRACAUDAL; PERINEURAL ONCE
Status: COMPLETED | OUTPATIENT
Start: 2019-08-07 | End: 2019-08-09

## 2019-08-07 RX ORDER — TRISODIUM CITRATE DIHYDRATE AND CITRIC ACID MONOHYDRATE 500; 334 MG/5ML; MG/5ML
30 SOLUTION ORAL AS NEEDED
Status: DISCONTINUED | OUTPATIENT
Start: 2019-08-07 | End: 2019-08-09

## 2019-08-07 RX ORDER — DEXTROSE, SODIUM CHLORIDE, SODIUM LACTATE, POTASSIUM CHLORIDE, AND CALCIUM CHLORIDE 5; .6; .31; .03; .02 G/100ML; G/100ML; G/100ML; G/100ML; G/100ML
INJECTION, SOLUTION INTRAVENOUS CONTINUOUS
Status: DISCONTINUED | OUTPATIENT
Start: 2019-08-07 | End: 2019-08-09 | Stop reason: HOSPADM

## 2019-08-07 RX ORDER — HYDROXYZINE HYDROCHLORIDE 50 MG/ML
50 INJECTION, SOLUTION INTRAMUSCULAR ONCE AS NEEDED
Status: ACTIVE | OUTPATIENT
Start: 2019-08-07 | End: 2019-08-07

## 2019-08-07 NOTE — PROGRESS NOTES
Pt is a 34year old female admitted to 371/371-A. Patient presents with:  Scheduled Induction     Pt is  41w1d intra-uterine pregnancy. History obtained, consents signed. Oriented to room, staff, and plan of care.

## 2019-08-08 ENCOUNTER — ANESTHESIA (OUTPATIENT)
Dept: OBGYN UNIT | Facility: HOSPITAL | Age: 29
End: 2019-08-08
Payer: COMMERCIAL

## 2019-08-08 ENCOUNTER — ANESTHESIA EVENT (OUTPATIENT)
Dept: OBGYN UNIT | Facility: HOSPITAL | Age: 29
End: 2019-08-08
Payer: COMMERCIAL

## 2019-08-08 PROCEDURE — 3E033VJ INTRODUCTION OF OTHER HORMONE INTO PERIPHERAL VEIN, PERCUTANEOUS APPROACH: ICD-10-PCS | Performed by: OBSTETRICS & GYNECOLOGY

## 2019-08-08 RX ORDER — LIDOCAINE HYDROCHLORIDE AND EPINEPHRINE 15; 5 MG/ML; UG/ML
INJECTION, SOLUTION EPIDURAL
Status: COMPLETED | OUTPATIENT
Start: 2019-08-08 | End: 2019-08-08

## 2019-08-08 RX ORDER — LIDOCAINE HYDROCHLORIDE AND EPINEPHRINE 20; 5 MG/ML; UG/ML
INJECTION, SOLUTION EPIDURAL; INFILTRATION; INTRACAUDAL; PERINEURAL
Status: DISCONTINUED
Start: 2019-08-08 | End: 2019-08-09 | Stop reason: WASHOUT

## 2019-08-08 RX ORDER — NALBUPHINE HCL 10 MG/ML
10 AMPUL (ML) INJECTION EVERY 4 HOURS PRN
Status: DISCONTINUED | OUTPATIENT
Start: 2019-08-08 | End: 2019-08-09

## 2019-08-08 RX ORDER — LIDOCAINE HYDROCHLORIDE 10 MG/ML
INJECTION, SOLUTION INFILTRATION; PERINEURAL
Status: COMPLETED | OUTPATIENT
Start: 2019-08-08 | End: 2019-08-08

## 2019-08-08 RX ORDER — LIDOCAINE HYDROCHLORIDE AND EPINEPHRINE 20; 5 MG/ML; UG/ML
20 INJECTION, SOLUTION EPIDURAL; INFILTRATION; INTRACAUDAL; PERINEURAL ONCE
Status: DISCONTINUED | OUTPATIENT
Start: 2019-08-08 | End: 2019-08-09

## 2019-08-08 RX ORDER — 0.9 % SODIUM CHLORIDE 0.9 %
VIAL (ML) INJECTION
Status: DISCONTINUED
Start: 2019-08-08 | End: 2019-08-09 | Stop reason: WASHOUT

## 2019-08-08 RX ORDER — HYDROXYZINE HYDROCHLORIDE 50 MG/ML
50 INJECTION, SOLUTION INTRAMUSCULAR EVERY 4 HOURS PRN
Status: DISCONTINUED | OUTPATIENT
Start: 2019-08-08 | End: 2019-08-09

## 2019-08-08 RX ORDER — BUPIVACAINE HYDROCHLORIDE 2.5 MG/ML
INJECTION, SOLUTION EPIDURAL; INFILTRATION; INTRACAUDAL
Status: DISCONTINUED
Start: 2019-08-08 | End: 2019-08-09 | Stop reason: WASHOUT

## 2019-08-08 RX ORDER — EPHEDRINE SULFATE/0.9% NACL/PF 25 MG/5 ML
SYRINGE (ML) INTRAVENOUS
Status: DISCONTINUED
Start: 2019-08-08 | End: 2019-08-09 | Stop reason: WASHOUT

## 2019-08-08 RX ORDER — BUPIVACAINE HYDROCHLORIDE 2.5 MG/ML
15 INJECTION, SOLUTION EPIDURAL; INFILTRATION; INTRACAUDAL ONCE
Status: DISCONTINUED | OUTPATIENT
Start: 2019-08-08 | End: 2019-08-09

## 2019-08-08 RX ORDER — NALBUPHINE HCL 10 MG/ML
2.5 AMPUL (ML) INJECTION
Status: DISCONTINUED | OUTPATIENT
Start: 2019-08-08 | End: 2019-08-09

## 2019-08-08 RX ORDER — EPHEDRINE SULFATE/0.9% NACL/PF 25 MG/5 ML
5 SYRINGE (ML) INTRAVENOUS AS NEEDED
Status: DISCONTINUED | OUTPATIENT
Start: 2019-08-08 | End: 2019-08-09

## 2019-08-08 RX ADMIN — LIDOCAINE HYDROCHLORIDE 5 ML: 10 INJECTION, SOLUTION INFILTRATION; PERINEURAL at 22:03:00

## 2019-08-08 RX ADMIN — LIDOCAINE HYDROCHLORIDE AND EPINEPHRINE 5 ML: 15; 5 INJECTION, SOLUTION EPIDURAL at 22:03:00

## 2019-08-08 NOTE — H&P
[address] [facility] Patient Status:  Inpatient    1990 MRN [id number]   Location [address] Attending Rohit Maddox, [address] Day # 1 PCP MATHEW SALAS, DO COLPOSCOPY, CERVIX W/UPPER ADJACENT VAGINA; W/BIOPSY(S), CERVIX       Family History:   Family History   Problem Relation Age of Onset   • Heart Disorder Father    • Hypertension Mother      Social History: Social History    Tobacco Use      Smoking status

## 2019-08-09 LAB
BASOPHILS # BLD AUTO: 0.03 X10(3) UL (ref 0–0.2)
BASOPHILS NFR BLD AUTO: 0.2 %
DEPRECATED RDW RBC AUTO: 45 FL (ref 35.1–46.3)
EOSINOPHIL # BLD AUTO: 0.04 X10(3) UL (ref 0–0.7)
EOSINOPHIL NFR BLD AUTO: 0.3 %
ERYTHROCYTE [DISTWIDTH] IN BLOOD BY AUTOMATED COUNT: 12.8 % (ref 11–15)
HCT VFR BLD AUTO: 34.3 % (ref 35–48)
HGB BLD-MCNC: 11.3 G/DL (ref 12–16)
IMM GRANULOCYTES # BLD AUTO: 0.06 X10(3) UL (ref 0–1)
IMM GRANULOCYTES NFR BLD: 0.4 %
LYMPHOCYTES # BLD AUTO: 1.15 X10(3) UL (ref 1–4)
LYMPHOCYTES NFR BLD AUTO: 7.6 %
MCH RBC QN AUTO: 31.4 PG (ref 26–34)
MCHC RBC AUTO-ENTMCNC: 32.9 G/DL (ref 31–37)
MCV RBC AUTO: 95.3 FL (ref 80–100)
MONOCYTES # BLD AUTO: 1.44 X10(3) UL (ref 0.1–1)
MONOCYTES NFR BLD AUTO: 9.6 %
NEUTROPHILS # BLD AUTO: 12.34 X10 (3) UL (ref 1.5–7.7)
NEUTROPHILS # BLD AUTO: 12.34 X10(3) UL (ref 1.5–7.7)
NEUTROPHILS NFR BLD AUTO: 81.9 %
PLATELET # BLD AUTO: 155 10(3)UL (ref 150–450)
RBC # BLD AUTO: 3.6 X10(6)UL (ref 3.8–5.3)
WBC # BLD AUTO: 15.1 X10(3) UL (ref 4–11)

## 2019-08-09 PROCEDURE — 0KQM0ZZ REPAIR PERINEUM MUSCLE, OPEN APPROACH: ICD-10-PCS | Performed by: OBSTETRICS & GYNECOLOGY

## 2019-08-09 PROCEDURE — 59410 OBSTETRICAL CARE: CPT | Performed by: OBSTETRICS & GYNECOLOGY

## 2019-08-09 RX ORDER — DIAPER,BRIEF,INFANT-TODD,DISP
1 EACH MISCELLANEOUS EVERY 6 HOURS PRN
Status: DISCONTINUED | OUTPATIENT
Start: 2019-08-09 | End: 2019-08-11

## 2019-08-09 RX ORDER — SODIUM CHLORIDE 0.9 % (FLUSH) 0.9 %
10 SYRINGE (ML) INJECTION AS NEEDED
Status: DISCONTINUED | OUTPATIENT
Start: 2019-08-09 | End: 2019-08-11

## 2019-08-09 RX ORDER — AMMONIA INHALANTS 0.04 G/.3ML
0.3 INHALANT RESPIRATORY (INHALATION) AS NEEDED
Status: DISCONTINUED | OUTPATIENT
Start: 2019-08-09 | End: 2019-08-11

## 2019-08-09 RX ORDER — CHOLECALCIFEROL (VITAMIN D3) 25 MCG
1 TABLET,CHEWABLE ORAL DAILY
Status: DISCONTINUED | OUTPATIENT
Start: 2019-08-09 | End: 2019-08-11

## 2019-08-09 RX ORDER — DOCUSATE SODIUM 100 MG/1
100 CAPSULE, LIQUID FILLED ORAL 2 TIMES DAILY
Status: DISCONTINUED | OUTPATIENT
Start: 2019-08-09 | End: 2019-08-11

## 2019-08-09 RX ORDER — ONDANSETRON 2 MG/ML
4 INJECTION INTRAMUSCULAR; INTRAVENOUS EVERY 6 HOURS PRN
Status: DISCONTINUED | OUTPATIENT
Start: 2019-08-09 | End: 2019-08-11

## 2019-08-09 RX ORDER — BISACODYL 10 MG
10 SUPPOSITORY, RECTAL RECTAL ONCE AS NEEDED
Status: DISCONTINUED | OUTPATIENT
Start: 2019-08-09 | End: 2019-08-11

## 2019-08-09 RX ORDER — IBUPROFEN 600 MG/1
600 TABLET ORAL EVERY 6 HOURS
Status: DISCONTINUED | OUTPATIENT
Start: 2019-08-09 | End: 2019-08-11

## 2019-08-09 RX ORDER — SIMETHICONE 80 MG
80 TABLET,CHEWABLE ORAL 3 TIMES DAILY PRN
Status: DISCONTINUED | OUTPATIENT
Start: 2019-08-09 | End: 2019-08-11

## 2019-08-09 NOTE — ANESTHESIA PROCEDURE NOTES
Labor Analgesia  Date/Time: 8/8/2019 10:03 PM  Performed by: Delfin Mcconnell MD  Authorized by: Delfin Mcconnell MD     Patient Location:  OB  Start Time:  8/8/2019 9:44 PM  End Time:  8/8/2019 10:04 PM  Site Identification: surface landmarks    Reason for Block:

## 2019-08-09 NOTE — PROGRESS NOTES
PT TRANSFERRED TO  OVERFLOW UNIT VIA WHEELCHAIR HOLDING , BOTH IN STABLE CONDITION, ACCOMPANIED BY FAMILY AND STAFF MEMBERS X2. SIGNIFICANT OTHER PRESENT CARRYING BELONGINGS.  UPON ARRIVAL TO ROOM 105 PT WAS ASSISTED TO THE BED, ORIENTED TO THE ENV

## 2019-08-09 NOTE — ANESTHESIA PREPROCEDURE EVALUATION
Anesthesia PreOp Note    HPI:     Evangelina Hanson is a 34year old female who presents for preoperative consultation requested by: * No surgeons listed *    Date of Surgery: 8/8/2019    * No procedures listed *  Indication: * No pre-op diagnosis entere Intravenous PRN Janessa oCburn MD     Nalbuphine HCl (NUBAIN) injection 2.5 mg 2.5 mg Intravenous Q15 Min PRN Janessa Coburn MD     Bupivacaine HCl (PF) (MARCAINE) 0.25 % injection 15 mL 15 mL Epidural Once Kaden MD Jayjay     lidocaine-EPINEPHrine 2 %-1:66424 DO     Nalbuphine HCl (NUBAIN) injection 10 mg 10 mg Intramuscular Once Shawn Jacques DO Stopped at 08/07/19 1830      No current [facility]-ordered outpatient medications on file.     No Known Allergies    Family History   Problem Relation Age of Onset   • H coffee q day        Occupational Exposure: Not Asked        Hobby Hazards: Not Asked        Sleep Concern: Not Asked        Stress Concern: Not Asked        Weight Concern: Not Asked        Special Diet: Not Asked        Back Care: Not Asked        Exercis EA      I have informed Chalo Hutchins and/or legal guardian or family member of the nature of the anesthetic plan, benefits, risks including possible dental damage if relevant, major complications, and any alternative forms of anesthetic management.

## 2019-08-09 NOTE — L&D DELIVERY NOTE
[facility]    Vaginal Delivery Note    Dustin Carrero Patient Status:  Inpatient    1990 MRN [id number]   Location [address] Attending Heriberto Reeves, [address] [facility] Day # 2 PCP MATHEW DIXIE LOVE, DO

## 2019-08-09 NOTE — ANESTHESIA POSTPROCEDURE EVALUATION
Patient: Arsen Erwin    Procedure Summary     Date:  08/08/19 Room / Location:      Anesthesia Start:  2140 Anesthesia Stop:  08/09/19 0206    Procedure:  LABOR ANALGESIA Diagnosis:      Scheduled Providers:   Anesthesiologist:  Karoline Ramsay MD

## 2019-08-09 NOTE — ANESTHESIA POSTPROCEDURE EVALUATION
Patient: Veronica Rowan    Procedure Summary     Date:  08/08/19 Room / Location:      Anesthesia Start:  2140 Anesthesia Stop:  08/09/19 0206    Procedure:  LABOR ANALGESIA Diagnosis:      Scheduled Providers:   Anesthesiologist:  Valerie Garcia MD

## 2019-08-09 NOTE — DISCHARGE SUMMARY
[facility]    Discharge Summary    Fabiolajoao Lam Patient Status:  Inpatient    1990 MRN [id number]   Location [address] [facility] Attending Gurdeep Guzman, [address] [facility] Day # 4       Admission date:  19

## 2019-08-10 NOTE — PLAN OF CARE
Problem: PAIN - ADULT  Goal: Verbalizes/displays adequate comfort level or patient's stated pain goal  Description  INTERVENTIONS:  - Encourage pt to monitor pain and request assistance  - Assess pain using appropriate pain scale  - Administer analgesics Progressing  Goal: Optimize infant feeding at the breast  Description  INTERVENTIONS:  - Initiate breast feeding within first hour after birth. - Monitor effectiveness of current breast feeding efforts.   - Assess support systems available to mother/famil interactions. - Assess caregiver's emotional status and coping mechanisms. - Encourage caregiver to participate in  daily care. - Assess support systems available to mother/family.  - Provide /case management support as needed.   Flavia Kilgore

## 2019-08-10 NOTE — PROGRESS NOTES
[facility]    Post  Progress Note      Linda Agustinkirit Patient Status:  Inpatient    1990 MRN [id number]   Location [facility] 3SE Attending Phyllis Leavitt DO   Hosp Day # 3 [facility]

## 2019-08-11 VITALS
DIASTOLIC BLOOD PRESSURE: 77 MMHG | WEIGHT: 205 LBS | HEART RATE: 70 BPM | SYSTOLIC BLOOD PRESSURE: 128 MMHG | BODY MASS INDEX: 31.07 KG/M2 | OXYGEN SATURATION: 100 % | HEIGHT: 68 IN | RESPIRATION RATE: 16 BRPM | TEMPERATURE: 98 F

## 2019-08-11 RX ORDER — IBUPROFEN 600 MG/1
600 TABLET ORAL EVERY 6 HOURS
Qty: 20 TABLET | Refills: 0 | Status: SHIPPED | OUTPATIENT
Start: 2019-08-11 | End: 2020-09-19

## 2019-08-11 NOTE — ADDENDUM NOTE
Encounter addended by: Tonya Fowler MD on: 8/11/2019 3:46 PM   Actions taken: Sign clinical note, Charge Capture section accepted

## 2019-09-24 ENCOUNTER — POSTPARTUM (OUTPATIENT)
Dept: OBGYN CLINIC | Facility: CLINIC | Age: 29
End: 2019-09-24
Payer: COMMERCIAL

## 2019-09-24 VITALS
SYSTOLIC BLOOD PRESSURE: 124 MMHG | DIASTOLIC BLOOD PRESSURE: 85 MMHG | HEART RATE: 76 BPM | WEIGHT: 177 LBS | BODY MASS INDEX: 27 KG/M2

## 2019-09-24 NOTE — PROGRESS NOTES
JOSE    Leo Diaz is a 34year old female  here for 6 week post-partum visit. Patient delivered a  female infant on 2019. Patient desires condoms for contraception. Patient is breast feeding.    Patient denies symptoms of depression, were placed in this encounter.

## 2019-10-09 ENCOUNTER — OFFICE VISIT (OUTPATIENT)
Dept: OBGYN CLINIC | Facility: CLINIC | Age: 29
End: 2019-10-09
Payer: COMMERCIAL

## 2019-10-09 VITALS
HEART RATE: 80 BPM | BODY MASS INDEX: 26 KG/M2 | WEIGHT: 173.19 LBS | DIASTOLIC BLOOD PRESSURE: 81 MMHG | SYSTOLIC BLOOD PRESSURE: 119 MMHG

## 2019-10-09 DIAGNOSIS — R87.810 ASCUS WITH POSITIVE HIGH RISK HPV CERVICAL: Primary | ICD-10-CM

## 2019-10-09 DIAGNOSIS — R87.610 ASCUS WITH POSITIVE HIGH RISK HPV CERVICAL: Primary | ICD-10-CM

## 2019-10-09 PROCEDURE — 57454 BX/CURETT OF CERVIX W/SCOPE: CPT | Performed by: OBSTETRICS & GYNECOLOGY

## 2019-10-09 NOTE — PROCEDURES
Colpo w/Cx Biopsy and ECC    Pregnancy Results: neg  Birth control method(s) used:  none     Consent signed. Procedure discussed with patient in detail including indication, risk, benefits, alternatives and complications.     Pre-Procedure:  Pre-Meds:  Ibu

## 2019-10-14 ENCOUNTER — PATIENT MESSAGE (OUTPATIENT)
Dept: OBGYN CLINIC | Facility: CLINIC | Age: 29
End: 2019-10-14

## 2019-10-15 NOTE — TELEPHONE ENCOUNTER
Message to [facility] on call to ask if you can review and advise on Colpo result in CAP's absence.

## 2019-10-15 NOTE — TELEPHONE ENCOUNTER
From: Loren Blind  To: Charito Escobar MD  Sent: 10/14/2019 10:34 PM CDT  Subject: Test Results Question    Results from the copo?

## 2020-07-21 NOTE — LETTER
Attempted to contact patient with recommendations. Message left to return call to the clinic.     Kalia Hussein, :1990    CONSENT FOR PROCEDURE/SEDATION    1. I authorize the performance upon Veronica Rowan  the following: Colposcopy with biopsy and Endocervical curettage    2.  I authorize Dr. Elizabeth Segura MD (and whomever is desig Relationship to patient: ____________________________________________    Witness: _________________________________________ Date:___________     Physician Signature: _______________________________ Date:___________

## 2020-08-31 ENCOUNTER — TELEPHONE (OUTPATIENT)
Dept: OBGYN CLINIC | Facility: CLINIC | Age: 30
End: 2020-08-31

## 2020-08-31 NOTE — TELEPHONE ENCOUNTER
Patient indicates she had two positive pregnancy test, requesting new ob with any of our providers, please call at:[phone],thanks.

## 2020-08-31 NOTE — TELEPHONE ENCOUNTER
Pt reports +HPT with lmp 7/31/2020 4w3d with monthly menses. Pt agrees to seeing all 6 providers both female and male. Pt aware first appt is with RN and not MD. Pt reports taking OTC PNV that includes DHA, Folic Acid, and Fe.  Assisted pt with scheduling O

## 2020-09-19 ENCOUNTER — INITIAL PRENATAL (OUTPATIENT)
Dept: OBGYN CLINIC | Facility: CLINIC | Age: 30
End: 2020-09-19
Payer: COMMERCIAL

## 2020-09-19 ENCOUNTER — LAB ENCOUNTER (OUTPATIENT)
Dept: LAB | Age: 30
End: 2020-09-19
Attending: OBSTETRICS & GYNECOLOGY
Payer: COMMERCIAL

## 2020-09-19 VITALS — BODY MASS INDEX: 24 KG/M2 | WEIGHT: 160 LBS

## 2020-09-19 DIAGNOSIS — Z34.91 ENCOUNTER FOR SUPERVISION OF NORMAL PREGNANCY IN FIRST TRIMESTER, UNSPECIFIED GRAVIDITY: ICD-10-CM

## 2020-09-19 DIAGNOSIS — Z34.91 ENCOUNTER FOR SUPERVISION OF NORMAL PREGNANCY IN FIRST TRIMESTER, UNSPECIFIED GRAVIDITY: Primary | ICD-10-CM

## 2020-09-19 PROBLEM — Z34.90 PREGNANCY: Status: RESOLVED | Noted: 2019-04-18 | Resolved: 2020-09-19

## 2020-09-19 PROBLEM — O48.0 POST-DATES PREGNANCY: Status: RESOLVED | Noted: 2019-08-07 | Resolved: 2020-09-19

## 2020-09-19 LAB
ANTIBODY SCREEN: NEGATIVE
BASOPHILS # BLD AUTO: 0.05 X10(3) UL (ref 0–0.2)
BASOPHILS NFR BLD AUTO: 0.6 %
DEPRECATED RDW RBC AUTO: 38.9 FL (ref 35.1–46.3)
EOSINOPHIL # BLD AUTO: 0.05 X10(3) UL (ref 0–0.7)
EOSINOPHIL NFR BLD AUTO: 0.6 %
ERYTHROCYTE [DISTWIDTH] IN BLOOD BY AUTOMATED COUNT: 11.4 % (ref 11–15)
HBV SURFACE AG SER-ACNC: <0.1 [IU]/L
HBV SURFACE AG SERPL QL IA: NONREACTIVE
HCG SERPL QL: POSITIVE
HCT VFR BLD AUTO: 39.8 %
HGB BLD-MCNC: 13.7 G/DL
IMM GRANULOCYTES # BLD AUTO: 0.02 X10(3) UL (ref 0–1)
IMM GRANULOCYTES NFR BLD: 0.2 %
LYMPHOCYTES # BLD AUTO: 1.57 X10(3) UL (ref 1–4)
LYMPHOCYTES NFR BLD AUTO: 17.5 %
MCH RBC QN AUTO: 32.2 PG (ref 26–34)
MCHC RBC AUTO-ENTMCNC: 34.4 G/DL (ref 31–37)
MCV RBC AUTO: 93.4 FL
MONOCYTES # BLD AUTO: 0.65 X10(3) UL (ref 0.1–1)
MONOCYTES NFR BLD AUTO: 7.2 %
NEUTROPHILS # BLD AUTO: 6.63 X10 (3) UL (ref 1.5–7.7)
NEUTROPHILS # BLD AUTO: 6.63 X10(3) UL (ref 1.5–7.7)
NEUTROPHILS NFR BLD AUTO: 73.9 %
PLATELET # BLD AUTO: 170 10(3)UL (ref 150–450)
RBC # BLD AUTO: 4.26 X10(6)UL
RH BLOOD TYPE: POSITIVE
RUBV IGG SER QL: POSITIVE
RUBV IGG SER-ACNC: 19.2 IU/ML (ref 10–?)
WBC # BLD AUTO: 9 X10(3) UL (ref 4–11)

## 2020-09-19 PROCEDURE — 86780 TREPONEMA PALLIDUM: CPT

## 2020-09-19 PROCEDURE — 87389 HIV-1 AG W/HIV-1&-2 AB AG IA: CPT

## 2020-09-19 PROCEDURE — 86900 BLOOD TYPING SEROLOGIC ABO: CPT

## 2020-09-19 PROCEDURE — 87340 HEPATITIS B SURFACE AG IA: CPT

## 2020-09-19 PROCEDURE — 36415 COLL VENOUS BLD VENIPUNCTURE: CPT

## 2020-09-19 PROCEDURE — 86850 RBC ANTIBODY SCREEN: CPT

## 2020-09-19 PROCEDURE — 85025 COMPLETE CBC W/AUTO DIFF WBC: CPT

## 2020-09-19 PROCEDURE — 86762 RUBELLA ANTIBODY: CPT

## 2020-09-19 PROCEDURE — 87086 URINE CULTURE/COLONY COUNT: CPT

## 2020-09-19 PROCEDURE — 84703 CHORIONIC GONADOTROPIN ASSAY: CPT

## 2020-09-19 PROCEDURE — 86901 BLOOD TYPING SEROLOGIC RH(D): CPT

## 2020-09-19 RX ORDER — GARLIC EXTRACT 500 MG
1 CAPSULE ORAL DAILY
COMMUNITY
End: 2020-12-01 | Stop reason: ALTCHOICE

## 2020-09-19 NOTE — PROGRESS NOTES
Pt had OBN PC appt today with no complaints. Normal PN labs and HCG ordered. Pt advised all labs must be completed and resulted prior to MD appt. Advised pt appt needs to be made to have labs done.  Assisted pt with scheduling NPN appt with SHELLY on 9/28/20 background):  MCV less than 80: No   Neural tube defect (Meningomyelocele, Spina bifida, or Anencephaly): No   Congenital heart defect: No   Down syndrome: No   Toan-Sachs (Ashkenazi Denominational, [facility] [place]): No   Canavan disease (Ashkenazi Denominational): N

## 2020-09-21 LAB — T PALLIDUM AB SER QL: NEGATIVE

## 2020-09-28 ENCOUNTER — INITIAL PRENATAL (OUTPATIENT)
Dept: OBGYN CLINIC | Facility: CLINIC | Age: 30
End: 2020-09-28
Payer: COMMERCIAL

## 2020-09-28 VITALS
DIASTOLIC BLOOD PRESSURE: 74 MMHG | WEIGHT: 167 LBS | BODY MASS INDEX: 25 KG/M2 | HEART RATE: 84 BPM | SYSTOLIC BLOOD PRESSURE: 110 MMHG

## 2020-09-28 DIAGNOSIS — Z11.3 SCREENING EXAMINATION FOR VENEREAL DISEASE: ICD-10-CM

## 2020-09-28 DIAGNOSIS — Z34.91 ENCOUNTER FOR SUPERVISION OF NORMAL PREGNANCY IN FIRST TRIMESTER, UNSPECIFIED GRAVIDITY: Primary | ICD-10-CM

## 2020-09-28 DIAGNOSIS — Z12.4 SCREENING FOR MALIGNANT NEOPLASM OF CERVIX: ICD-10-CM

## 2020-09-28 PROCEDURE — 3078F DIAST BP <80 MM HG: CPT | Performed by: OBSTETRICS & GYNECOLOGY

## 2020-09-28 PROCEDURE — 76801 OB US < 14 WKS SINGLE FETUS: CPT | Performed by: OBSTETRICS & GYNECOLOGY

## 2020-09-28 PROCEDURE — 3074F SYST BP LT 130 MM HG: CPT | Performed by: OBSTETRICS & GYNECOLOGY

## 2020-09-28 PROCEDURE — 81002 URINALYSIS NONAUTO W/O SCOPE: CPT | Performed by: OBSTETRICS & GYNECOLOGY

## 2020-09-30 NOTE — PROGRESS NOTES
Saqib at visit. August [facility] ) is well. Only 2 monthly menses during lactation. She has now stopped lactation. US = dates. They decline genetics. She is having some nausea without emesis. Hx of CM 2 followed conservatively in 2018 .  Pap today and fur

## 2020-10-05 NOTE — PROGRESS NOTES
Landon Brady. You have negative pap result. Repeat exam yearly after pregnancy and then pap in 3 years.

## 2020-10-26 ENCOUNTER — ROUTINE PRENATAL (OUTPATIENT)
Dept: OBGYN CLINIC | Facility: CLINIC | Age: 30
End: 2020-10-26
Payer: COMMERCIAL

## 2020-10-26 VITALS
BODY MASS INDEX: 26 KG/M2 | DIASTOLIC BLOOD PRESSURE: 80 MMHG | WEIGHT: 170 LBS | HEART RATE: 79 BPM | SYSTOLIC BLOOD PRESSURE: 117 MMHG

## 2020-10-26 DIAGNOSIS — Z34.92 ENCOUNTER FOR SUPERVISION OF NORMAL PREGNANCY IN SECOND TRIMESTER, UNSPECIFIED GRAVIDITY: Primary | ICD-10-CM

## 2020-10-26 PROCEDURE — 81002 URINALYSIS NONAUTO W/O SCOPE: CPT | Performed by: OBSTETRICS & GYNECOLOGY

## 2020-10-26 PROCEDURE — 3074F SYST BP LT 130 MM HG: CPT | Performed by: OBSTETRICS & GYNECOLOGY

## 2020-10-26 PROCEDURE — 3079F DIAST BP 80-89 MM HG: CPT | Performed by: OBSTETRICS & GYNECOLOGY

## 2020-11-28 ENCOUNTER — HOSPITAL ENCOUNTER (OUTPATIENT)
Dept: ULTRASOUND IMAGING | Facility: HOSPITAL | Age: 30
Discharge: HOME | End: 2020-11-28
Attending: OBSTETRICS & GYNECOLOGY
Payer: COMMERCIAL

## 2020-11-28 ENCOUNTER — ROUTINE PRENATAL (OUTPATIENT)
Dept: OBGYN CLINIC | Facility: CLINIC | Age: 30
End: 2020-11-28
Payer: COMMERCIAL

## 2020-11-28 ENCOUNTER — TELEPHONE (OUTPATIENT)
Dept: OBGYN CLINIC | Facility: CLINIC | Age: 30
End: 2020-11-28

## 2020-11-28 VITALS
WEIGHT: 180 LBS | SYSTOLIC BLOOD PRESSURE: 114 MMHG | DIASTOLIC BLOOD PRESSURE: 74 MMHG | HEART RATE: 79 BPM | BODY MASS INDEX: 27 KG/M2

## 2020-11-28 DIAGNOSIS — O20.0 THREATENED ABORTION, ANTEPARTUM: ICD-10-CM

## 2020-11-28 DIAGNOSIS — Z34.93 ENCOUNTER FOR SUPERVISION OF NORMAL PREGNANCY IN THIRD TRIMESTER, UNSPECIFIED GRAVIDITY: Primary | ICD-10-CM

## 2020-11-28 DIAGNOSIS — O36.4XX0 FETAL DEATH AFFECTING MANAGEMENT OF MOTHER, SINGLE OR UNSPECIFIED FETUS: Primary | ICD-10-CM

## 2020-11-28 PROCEDURE — 3078F DIAST BP <80 MM HG: CPT | Performed by: OBSTETRICS & GYNECOLOGY

## 2020-11-28 PROCEDURE — 3074F SYST BP LT 130 MM HG: CPT | Performed by: OBSTETRICS & GYNECOLOGY

## 2020-11-28 PROCEDURE — 81002 URINALYSIS NONAUTO W/O SCOPE: CPT | Performed by: OBSTETRICS & GYNECOLOGY

## 2020-11-28 PROCEDURE — 76816 OB US FOLLOW-UP PER FETUS: CPT | Performed by: OBSTETRICS & GYNECOLOGY

## 2020-11-28 NOTE — PROGRESS NOTES
VO from [address] for hold and call d/t no heart tones and requesting if  can be present d/t circumstances. Per Conner appt at 12:30pm drink 32oz water and hold. Blue parking diagnostic main.  [facility] doing [facility] about BRYCE's request but st

## 2020-11-28 NOTE — PROGRESS NOTES
No issues. Reports FM however no FHT noted with doppler or US today. To US for hold and call. Declined flu shot.

## 2020-11-28 NOTE — TELEPHONE ENCOUNTER
On call--pt sent from office for hold and call [place] as no FHT seen on BSUS. Confirmed demise in radiology. Discussed management options with patient and her  including IOL vs D&E. Risks/benefits of each reviewed in extensive detail.   Complications a

## 2020-11-30 ENCOUNTER — APPOINTMENT (OUTPATIENT)
Dept: LAB | Age: 30
End: 2020-11-30
Attending: OBSTETRICS & GYNECOLOGY
Payer: COMMERCIAL

## 2020-11-30 DIAGNOSIS — O02.1 FETAL DEMISE BEFORE 20 WEEKS WITH RETENTION OF DEAD FETUS: ICD-10-CM

## 2020-11-30 RX ORDER — ACETAMINOPHEN 500 MG
1000 TABLET ORAL ONCE
Status: CANCELLED | OUTPATIENT
Start: 2020-11-30 | End: 2020-11-30

## 2020-11-30 NOTE — TELEPHONE ENCOUNTER
Patient calling in to transfer to [facility] for D&E to Glenda Smith. Trying to talk with Abdiel Holden this morning    Please advise.

## 2020-12-01 ENCOUNTER — ANESTHESIA EVENT (OUTPATIENT)
Dept: SURGERY | Facility: HOSPITAL | Age: 30
End: 2020-12-01
Payer: COMMERCIAL

## 2020-12-01 ENCOUNTER — TELEPHONE (OUTPATIENT)
Dept: OBGYN UNIT | Facility: HOSPITAL | Age: 30
End: 2020-12-01

## 2020-12-01 NOTE — PROGRESS NOTES
Pt contacted to inform of SHARE support services available and condolences expressed. Pt also advised that she will be asked to sign a Fetal Death Disposition Form upon admit. Choices explained, and pt verbalizes understanding. Pt states Brooke Tafoya will not be using a  home\" due to the gestational age. She is aware that SHARE schedules and information are being mailed to her, and that I am available for follow up.

## 2020-12-02 ENCOUNTER — ANESTHESIA (OUTPATIENT)
Dept: SURGERY | Facility: HOSPITAL | Age: 30
End: 2020-12-02
Payer: COMMERCIAL

## 2020-12-02 ENCOUNTER — HOSPITAL ENCOUNTER (OUTPATIENT)
Facility: HOSPITAL | Age: 30
Setting detail: HOSPITAL OUTPATIENT SURGERY
Discharge: HOME | End: 2020-12-02
Attending: OBSTETRICS & GYNECOLOGY | Admitting: OBSTETRICS & GYNECOLOGY
Payer: COMMERCIAL

## 2020-12-02 ENCOUNTER — HOSPITAL ENCOUNTER (OUTPATIENT)
Dept: ULTRASOUND IMAGING | Facility: HOSPITAL | Age: 30
Setting detail: HOSPITAL OUTPATIENT SURGERY
Discharge: HOME | End: 2020-12-02
Attending: OBSTETRICS & GYNECOLOGY | Admitting: OBSTETRICS & GYNECOLOGY
Payer: COMMERCIAL

## 2020-12-02 VITALS
HEIGHT: 68 IN | HEART RATE: 84 BPM | WEIGHT: 177 LBS | OXYGEN SATURATION: 100 % | SYSTOLIC BLOOD PRESSURE: 102 MMHG | BODY MASS INDEX: 26.83 KG/M2 | RESPIRATION RATE: 18 BRPM | DIASTOLIC BLOOD PRESSURE: 82 MMHG | TEMPERATURE: 98 F

## 2020-12-02 DIAGNOSIS — O02.1 FETAL DEMISE BEFORE 20 WEEKS WITH RETENTION OF DEAD FETUS: ICD-10-CM

## 2020-12-02 DIAGNOSIS — O02.1 FETAL DEMISE BEFORE 20 WEEKS WITH RETENTION OF DEAD FETUS: Primary | ICD-10-CM

## 2020-12-02 PROCEDURE — 88233 TISSUE CULTURE SKIN/BIOPSY: CPT | Performed by: OBSTETRICS & GYNECOLOGY

## 2020-12-02 PROCEDURE — 10D17ZZ EXTRACTION OF PRODUCTS OF CONCEPTION, RETAINED, VIA NATURAL OR ARTIFICIAL OPENING: ICD-10-PCS | Performed by: OBSTETRICS & GYNECOLOGY

## 2020-12-02 PROCEDURE — 88305 TISSUE EXAM BY PATHOLOGIST: CPT | Performed by: OBSTETRICS & GYNECOLOGY

## 2020-12-02 PROCEDURE — 88262 CHROMOSOME ANALYSIS 15-20: CPT | Performed by: OBSTETRICS & GYNECOLOGY

## 2020-12-02 PROCEDURE — 81229 CYTOG ALYS CHRML ABNR SNPCGH: CPT | Performed by: OBSTETRICS & GYNECOLOGY

## 2020-12-02 PROCEDURE — 88291 CYTO/MOLECULAR REPORT: CPT | Performed by: OBSTETRICS & GYNECOLOGY

## 2020-12-02 PROCEDURE — 76998 US GUIDE INTRAOP: CPT | Performed by: OBSTETRICS & GYNECOLOGY

## 2020-12-02 PROCEDURE — 85027 COMPLETE CBC AUTOMATED: CPT

## 2020-12-02 RX ORDER — ONDANSETRON 2 MG/ML
4 INJECTION INTRAMUSCULAR; INTRAVENOUS AS NEEDED
Status: DISCONTINUED | OUTPATIENT
Start: 2020-12-02 | End: 2020-12-02

## 2020-12-02 RX ORDER — METOCLOPRAMIDE HYDROCHLORIDE 5 MG/ML
10 INJECTION INTRAMUSCULAR; INTRAVENOUS AS NEEDED
Status: DISCONTINUED | OUTPATIENT
Start: 2020-12-02 | End: 2020-12-02

## 2020-12-02 RX ORDER — MIDAZOLAM HYDROCHLORIDE 1 MG/ML
INJECTION INTRAMUSCULAR; INTRAVENOUS AS NEEDED
Status: DISCONTINUED | OUTPATIENT
Start: 2020-12-02 | End: 2020-12-02 | Stop reason: SURG

## 2020-12-02 RX ORDER — MEPERIDINE HYDROCHLORIDE 25 MG/ML
12.5 INJECTION INTRAMUSCULAR; INTRAVENOUS; SUBCUTANEOUS AS NEEDED
Status: DISCONTINUED | OUTPATIENT
Start: 2020-12-02 | End: 2020-12-02

## 2020-12-02 RX ORDER — METHYLERGONOVINE MALEATE 0.2 MG/ML
INJECTION INTRAVENOUS AS NEEDED
Status: DISCONTINUED | OUTPATIENT
Start: 2020-12-02 | End: 2020-12-02 | Stop reason: SURG

## 2020-12-02 RX ORDER — LIDOCAINE HYDROCHLORIDE 10 MG/ML
INJECTION, SOLUTION EPIDURAL; INFILTRATION; INTRACAUDAL; PERINEURAL AS NEEDED
Status: DISCONTINUED | OUTPATIENT
Start: 2020-12-02 | End: 2020-12-02 | Stop reason: SURG

## 2020-12-02 RX ORDER — SODIUM CHLORIDE, SODIUM LACTATE, POTASSIUM CHLORIDE, CALCIUM CHLORIDE 600; 310; 30; 20 MG/100ML; MG/100ML; MG/100ML; MG/100ML
INJECTION, SOLUTION INTRAVENOUS CONTINUOUS
Status: DISCONTINUED | OUTPATIENT
Start: 2020-12-02 | End: 2020-12-02

## 2020-12-02 RX ORDER — DEXAMETHASONE SODIUM PHOSPHATE 4 MG/ML
8 VIAL (ML) INJECTION AS NEEDED
Status: DISCONTINUED | OUTPATIENT
Start: 2020-12-02 | End: 2020-12-02

## 2020-12-02 RX ORDER — HYDROMORPHONE HYDROCHLORIDE 1 MG/ML
0.4 INJECTION, SOLUTION INTRAMUSCULAR; INTRAVENOUS; SUBCUTANEOUS EVERY 5 MIN PRN
Status: DISCONTINUED | OUTPATIENT
Start: 2020-12-02 | End: 2020-12-02

## 2020-12-02 RX ORDER — METHYLERGONOVINE MALEATE 0.2 MG/1
0.2 TABLET ORAL EVERY 8 HOURS
Qty: 5 TABLET | Refills: 0 | Status: SHIPPED | OUTPATIENT
Start: 2020-12-02 | End: 2020-12-04

## 2020-12-02 RX ORDER — HYDROCODONE BITARTRATE AND ACETAMINOPHEN 5; 325 MG/1; MG/1
2 TABLET ORAL AS NEEDED
Status: DISCONTINUED | OUTPATIENT
Start: 2020-12-02 | End: 2020-12-02

## 2020-12-02 RX ORDER — NALOXONE HYDROCHLORIDE 0.4 MG/ML
80 INJECTION, SOLUTION INTRAMUSCULAR; INTRAVENOUS; SUBCUTANEOUS AS NEEDED
Status: DISCONTINUED | OUTPATIENT
Start: 2020-12-02 | End: 2020-12-02

## 2020-12-02 RX ORDER — IBUPROFEN 600 MG/1
600 TABLET ORAL EVERY 6 HOURS PRN
Qty: 30 TABLET | Refills: 0 | Status: SHIPPED | OUTPATIENT
Start: 2020-12-02 | End: 2020-12-30

## 2020-12-02 RX ORDER — ONDANSETRON 2 MG/ML
INJECTION INTRAMUSCULAR; INTRAVENOUS AS NEEDED
Status: DISCONTINUED | OUTPATIENT
Start: 2020-12-02 | End: 2020-12-02 | Stop reason: SURG

## 2020-12-02 RX ORDER — MIDAZOLAM HYDROCHLORIDE 1 MG/ML
1 INJECTION INTRAMUSCULAR; INTRAVENOUS EVERY 5 MIN PRN
Status: DISCONTINUED | OUTPATIENT
Start: 2020-12-02 | End: 2020-12-02

## 2020-12-02 RX ORDER — MISOPROSTOL 200 UG/1
TABLET ORAL AS NEEDED
Status: DISCONTINUED | OUTPATIENT
Start: 2020-12-02 | End: 2020-12-02 | Stop reason: HOSPADM

## 2020-12-02 RX ORDER — DEXAMETHASONE SODIUM PHOSPHATE 4 MG/ML
VIAL (ML) INJECTION AS NEEDED
Status: DISCONTINUED | OUTPATIENT
Start: 2020-12-02 | End: 2020-12-02 | Stop reason: SURG

## 2020-12-02 RX ORDER — HYDROCODONE BITARTRATE AND ACETAMINOPHEN 5; 325 MG/1; MG/1
1 TABLET ORAL AS NEEDED
Status: DISCONTINUED | OUTPATIENT
Start: 2020-12-02 | End: 2020-12-02

## 2020-12-02 RX ORDER — KETOROLAC TROMETHAMINE 30 MG/ML
INJECTION, SOLUTION INTRAMUSCULAR; INTRAVENOUS AS NEEDED
Status: DISCONTINUED | OUTPATIENT
Start: 2020-12-02 | End: 2020-12-02 | Stop reason: SURG

## 2020-12-02 RX ADMIN — SODIUM CHLORIDE, SODIUM LACTATE, POTASSIUM CHLORIDE, CALCIUM CHLORIDE: 600; 310; 30; 20 INJECTION, SOLUTION INTRAVENOUS at 08:29:00

## 2020-12-02 RX ADMIN — DEXAMETHASONE SODIUM PHOSPHATE 8 MG: 4 MG/ML VIAL (ML) INJECTION at 07:47:00

## 2020-12-02 RX ADMIN — METHYLERGONOVINE MALEATE 0.2 MG: 0.2 INJECTION INTRAVENOUS at 08:01:00

## 2020-12-02 RX ADMIN — LIDOCAINE HYDROCHLORIDE 50 MG: 10 INJECTION, SOLUTION EPIDURAL; INFILTRATION; INTRACAUDAL; PERINEURAL at 07:25:00

## 2020-12-02 RX ADMIN — ONDANSETRON 4 MG: 2 INJECTION INTRAMUSCULAR; INTRAVENOUS at 07:22:00

## 2020-12-02 RX ADMIN — MIDAZOLAM HYDROCHLORIDE 2 MG: 1 INJECTION INTRAMUSCULAR; INTRAVENOUS at 07:23:00

## 2020-12-02 RX ADMIN — SODIUM CHLORIDE, SODIUM LACTATE, POTASSIUM CHLORIDE, CALCIUM CHLORIDE: 600; 310; 30; 20 INJECTION, SOLUTION INTRAVENOUS at 08:13:00

## 2020-12-02 RX ADMIN — KETOROLAC TROMETHAMINE 30 MG: 30 INJECTION, SOLUTION INTRAMUSCULAR; INTRAVENOUS at 08:13:00

## 2020-12-02 NOTE — OPERATIVE REPORT
[address] [place]    PATIENT'S NAME: Raz Luna   ATTENDING PHYSICIAN: Ashwini Pierce M.D. OPERATING PHYSICIAN: Ashwini Pierce M.D.    PATIENT ACCOUNT#:   [de-identified]    LOCATION:  [address] [place] [address] 3 EDWP 10  MEDICAL RECORD #:   [id number] 09:45:29  Psychiatric [id number]  RADHAY/

## 2020-12-02 NOTE — H&P
Signed             Pt is 32 y/o  - here for consultation with regard to IUFD at 16 w 6d    PMH - 1st baby - FT - no complications -  - [id number]              No surgeries              Abnl Pap - HPV in the past colpo with vx's - just followed - last

## 2020-12-02 NOTE — ANESTHESIA PREPROCEDURE EVALUATION
PRE-OP EVALUATION    Patient Name: Lakeisha Lund    Pre-op Diagnosis: Fetal demise before 25 weeks with retention of dead fetus [O02.1]    Procedure(s):   HYSTEROSCOPIC DILATION AND EVACUATION UNDER ULTRASOUND GUIDANCE     Surgeon(s) and Role:     * J Date    WBC 8.1 12/02/2020    RBC 4.26 12/02/2020    HGB 13.3 12/02/2020    HCT 38.0 12/02/2020    MCV 89.2 12/02/2020    MCH 31.2 12/02/2020    MCHC 35.0 12/02/2020    RDW 11.9 12/02/2020    .0 12/02/2020               Airway      Mallampati: II  M

## 2020-12-02 NOTE — ANESTHESIA POSTPROCEDURE EVALUATION
[address] [facility] Patient Status:  Hospital Outpatient Surgery   Age/Gender 31year old female MRN [id number]   [facility] SURGERY Attending Yuliana Hickman MD   Hosp Day # 0 PCP MATHEW SALAS, DO       Anesthesia Post Unchanged from Preop

## 2020-12-02 NOTE — BRIEF OP NOTE
Pre-Operative Diagnosis: Fetal demise before 20 weeks with retention of dead fetus [O02.1]     Post-Operative Diagnosis: Fetal demise before 20 weeks with retention of dead fetus [O02.1]      Procedure Performed:   Procedure(s):  Carey

## 2020-12-02 NOTE — ANESTHESIA PROCEDURE NOTES
Airway  Urgency: elective      General Information and Staff    Patient location during procedure: OR  Anesthesiologist: Annelise Matias MD  Performed: anesthesiologist     Indications and Patient Condition  Indications for airway management: anesthesia  Marium

## 2020-12-23 NOTE — PROGRESS NOTES
Pt aware of results and gender  Pt reports scant to light vaginal bleeding daily  Denies heavy bleeding or pain  Pt advised to monitor, call if new or worsening symptoms  Bleeding precautions given  Call PRN

## 2020-12-30 ENCOUNTER — TELEPHONE (OUTPATIENT)
Dept: PERINATAL CARE | Facility: HOSPITAL | Age: 30
End: 2020-12-30

## 2020-12-30 DIAGNOSIS — Z87.59 HISTORY OF IUFD: Primary | ICD-10-CM

## 2020-12-31 NOTE — TELEPHONE ENCOUNTER
Dr. Jaycob Lau spoke with Dr. Lorena Mejia who recommends RPL labs and have pt schedule appt with [facility] end of February for preconception with 2nd trimester loss. Labs ordered and my chart message sent to pt.    Left message for pt to check message and call if any

## 2021-02-05 ENCOUNTER — APPOINTMENT (OUTPATIENT)
Dept: GENETICS | Facility: HOSPITAL | Age: 31
End: 2021-02-05
Attending: GENETIC COUNSELOR, MS
Payer: COMMERCIAL

## 2021-02-05 ENCOUNTER — APPOINTMENT (OUTPATIENT)
Dept: HEMATOLOGY/ONCOLOGY | Facility: HOSPITAL | Age: 31
End: 2021-02-05
Attending: GENETIC COUNSELOR, MS
Payer: COMMERCIAL

## (undated) DEVICE — TUBING SUCTION COLLECTION SET

## (undated) DEVICE — NEEDLE SPINAL 22X3-1/2 BLK

## (undated) DEVICE — GYN CDS: Brand: MEDLINE INDUSTRIES, INC.

## (undated) DEVICE — CURRETTE 11MM CVD

## (undated) DEVICE — SOL  .9 1000ML BTL

## (undated) DEVICE — SPECIMEN CONTAINER,POSITIVE SEAL INDICATOR, OR PACKAGED: Brand: PRECISION

## (undated) DEVICE — STERILE POLYISOPRENE POWDER-FREE SURGICAL GLOVES: Brand: PROTEXIS

## (undated) DEVICE — KENDALL SCD EXPRESS SLEEVES, KNEE LENGTH, MEDIUM: Brand: KENDALL SCD

## (undated) DEVICE — CANISTER SAFETOUCH SYST DISP

## (undated) NOTE — ED AVS SNAPSHOT
[facility] in [place] [address]    Phone:  [phone] [address] [facility]   MRN: [id number]    Department:  [facility]   Date of Visit:  4/29/2017 benefit level being available to you or other limited reimbursement. The physician may seek payment directly from you for amounts other than your deductible, co-payment, or co-insurance and for other services not covered under your health insurance plan. If you believe that any of the medications or instructions on this list is different from what your Primary Care doctor has instructed you - please continue to take your medications as instructed by your Primary Care doctor until you can check with your do [address] to help you get signed up for insurance coverage. Patient [address] is a Federal Navigator program that can help with your Affordable Care Act coverage, as well as all types of Medicaid plans.   To get signed up and covere

## (undated) NOTE — LETTER
[facility]  Phone: [phone]  OUTSIDE TESTING RESULT REQUEST      TO:   Dr. Cam Rizo Date: 11/30/20    FAX #: [phone]     IMPORTANT: FOR YOUR IMMEDIATE ATTENTION    Please FAX all test results listed below

## (undated) NOTE — LETTER
VACCINE ADMINISTRATION RECORD  PARENT / GUARDIAN APPROVAL  Date: 2019  Vaccine administered to: Linda Angulo     : 1990    MRN: [id number]    A copy of the appropriate Centers for Disease Control and Prevention Vaccine Information stateme

## (undated) NOTE — MR AVS SNAPSHOT
[address] [id number] [phone]               Thank you for choosing us for your health care visit with Mukesh Hudson MD.  We are glad to serve you and happy to provide you with this sum your Zip Code and Date of Birth to complete the sign-up process. If you do not sign up before the expiration date, you must request a new code.     Your unique [employer] Access Code: [email or web address]  Expires: 4/8/2017  4:01 PM    If you have questions, you can ca

## (undated) NOTE — LETTER
[facility]  Testing Department  Phone: [phone]  OUTSIDE TESTING RESULT REQUEST      TO:  Dr. Amanuel Padilla Date: 11/30/20    FAX #: [phone]     IMPORTANT: FOR YOUR IMMEDIATE ATTENTION    Please FAX all test results listed katherine

## (undated) NOTE — LETTER
Radha Hussein, :1990    CONSENT FOR PROCEDURE/SEDATION    1. I authorize the performance upon Jaime Butler  the following: Colposcopy with biopsy and Endocervical curettage    2.  I authorize Dr. Charlene Cooper MD (and whomever is cruz Relationship to patient: ____________________________________________    Witness: _________________________________________ Date:___________     Physician Signature: _______________________________ Date:___________

## (undated) NOTE — LETTER
AUTHORIZATION FOR SURGICAL OPERATION OR OTHER PROCEDURE    1. I hereby authorize Dr. Juan Solorzano, [facility] assigned to my case to perform the following operation and/or procedure at the [facility]:    Colposcopy    2.   My physician has Relationship to Patient:           []  Parent    Responsible person                          []  Spouse  In case of minor or                    [] Other  _____________   Incompetent name:  __________________________________________________